# Patient Record
Sex: FEMALE | Race: BLACK OR AFRICAN AMERICAN | Employment: FULL TIME | URBAN - METROPOLITAN AREA
[De-identification: names, ages, dates, MRNs, and addresses within clinical notes are randomized per-mention and may not be internally consistent; named-entity substitution may affect disease eponyms.]

---

## 2017-11-11 ENCOUNTER — APPOINTMENT (OUTPATIENT)
Dept: CT IMAGING | Age: 27
End: 2017-11-11
Attending: EMERGENCY MEDICINE
Payer: SELF-PAY

## 2017-11-11 ENCOUNTER — HOSPITAL ENCOUNTER (EMERGENCY)
Age: 27
Discharge: HOME | End: 2017-11-11
Attending: EMERGENCY MEDICINE
Payer: SELF-PAY

## 2017-11-11 VITALS
HEART RATE: 80 BPM | BODY MASS INDEX: 30.22 KG/M2 | DIASTOLIC BLOOD PRESSURE: 77 MMHG | SYSTOLIC BLOOD PRESSURE: 109 MMHG | RESPIRATION RATE: 15 BRPM | HEIGHT: 66 IN | TEMPERATURE: 98.3 F | OXYGEN SATURATION: 99 % | WEIGHT: 188 LBS

## 2017-11-11 DIAGNOSIS — K62.5 RECTAL BLEEDING: Primary | ICD-10-CM

## 2017-11-11 DIAGNOSIS — N30.00 ACUTE CYSTITIS WITHOUT HEMATURIA: ICD-10-CM

## 2017-11-11 DIAGNOSIS — K52.9 GASTROENTERITIS, ACUTE: ICD-10-CM

## 2017-11-11 DIAGNOSIS — R11.2 NAUSEA AND VOMITING, INTRACTABILITY OF VOMITING NOT SPECIFIED, UNSPECIFIED VOMITING TYPE: ICD-10-CM

## 2017-11-11 DIAGNOSIS — R19.7 DIARRHEA, UNSPECIFIED TYPE: ICD-10-CM

## 2017-11-11 LAB
ALBUMIN SERPL-MCNC: 3.9 G/DL (ref 3.5–5)
ALBUMIN/GLOB SERPL: 0.9 {RATIO} (ref 1.1–2.2)
ALP SERPL-CCNC: 92 U/L (ref 45–117)
ALT SERPL-CCNC: 57 U/L (ref 12–78)
ANION GAP SERPL CALC-SCNC: 8 MMOL/L (ref 5–15)
APPEARANCE UR: ABNORMAL
AST SERPL-CCNC: 23 U/L (ref 15–37)
BACTERIA URNS QL MICRO: NEGATIVE /HPF
BASOPHILS # BLD: 0 K/UL (ref 0–0.1)
BASOPHILS NFR BLD: 0 % (ref 0–1)
BILIRUB SERPL-MCNC: 0.3 MG/DL (ref 0.2–1)
BILIRUB UR QL: NEGATIVE
BUN SERPL-MCNC: 10 MG/DL (ref 6–20)
BUN/CREAT SERPL: 12 (ref 12–20)
CALCIUM SERPL-MCNC: 8.6 MG/DL (ref 8.5–10.1)
CHLORIDE SERPL-SCNC: 105 MMOL/L (ref 97–108)
CO2 SERPL-SCNC: 26 MMOL/L (ref 21–32)
COLOR UR: ABNORMAL
CREAT SERPL-MCNC: 0.85 MG/DL (ref 0.55–1.02)
EOSINOPHIL # BLD: 0.3 K/UL (ref 0–0.4)
EOSINOPHIL NFR BLD: 4 % (ref 0–7)
EPITH CASTS URNS QL MICRO: ABNORMAL /LPF
ERYTHROCYTE [DISTWIDTH] IN BLOOD BY AUTOMATED COUNT: 12.3 % (ref 11.5–14.5)
GLOBULIN SER CALC-MCNC: 4.2 G/DL (ref 2–4)
GLUCOSE SERPL-MCNC: 80 MG/DL (ref 65–100)
GLUCOSE UR STRIP.AUTO-MCNC: NEGATIVE MG/DL
HCG UR QL: NEGATIVE
HCT VFR BLD AUTO: 38.7 % (ref 35–47)
HEMOCCULT STL QL: POSITIVE
HGB BLD-MCNC: 13.1 G/DL (ref 11.5–16)
HGB UR QL STRIP: ABNORMAL
HYALINE CASTS URNS QL MICRO: ABNORMAL /LPF (ref 0–5)
KETONES UR QL STRIP.AUTO: NEGATIVE MG/DL
LEUKOCYTE ESTERASE UR QL STRIP.AUTO: ABNORMAL
LIPASE SERPL-CCNC: 80 U/L (ref 73–393)
LYMPHOCYTES # BLD: 1.8 K/UL (ref 0.8–3.5)
LYMPHOCYTES NFR BLD: 26 % (ref 12–49)
MCH RBC QN AUTO: 30.2 PG (ref 26–34)
MCHC RBC AUTO-ENTMCNC: 33.9 G/DL (ref 30–36.5)
MCV RBC AUTO: 89.2 FL (ref 80–99)
MONOCYTES # BLD: 0.6 K/UL (ref 0–1)
MONOCYTES NFR BLD: 9 % (ref 5–13)
NEUTS SEG # BLD: 4.1 K/UL (ref 1.8–8)
NEUTS SEG NFR BLD: 61 % (ref 32–75)
NITRITE UR QL STRIP.AUTO: NEGATIVE
PH UR STRIP: 5.5 [PH] (ref 5–8)
PLATELET # BLD AUTO: 313 K/UL (ref 150–400)
POTASSIUM SERPL-SCNC: 3.9 MMOL/L (ref 3.5–5.1)
PROT SERPL-MCNC: 8.1 G/DL (ref 6.4–8.2)
PROT UR STRIP-MCNC: NEGATIVE MG/DL
RBC # BLD AUTO: 4.34 M/UL (ref 3.8–5.2)
RBC #/AREA URNS HPF: ABNORMAL /HPF (ref 0–5)
SODIUM SERPL-SCNC: 139 MMOL/L (ref 136–145)
SP GR UR REFRACTOMETRY: 1.03 (ref 1–1.03)
UR CULT HOLD, URHOLD: NORMAL
UROBILINOGEN UR QL STRIP.AUTO: 0.2 EU/DL (ref 0.2–1)
WBC # BLD AUTO: 6.7 K/UL (ref 3.6–11)
WBC URNS QL MICRO: ABNORMAL /HPF (ref 0–4)

## 2017-11-11 PROCEDURE — 82272 OCCULT BLD FECES 1-3 TESTS: CPT | Performed by: EMERGENCY MEDICINE

## 2017-11-11 PROCEDURE — 74011250637 HC RX REV CODE- 250/637: Performed by: EMERGENCY MEDICINE

## 2017-11-11 PROCEDURE — 81001 URINALYSIS AUTO W/SCOPE: CPT | Performed by: EMERGENCY MEDICINE

## 2017-11-11 PROCEDURE — 85025 COMPLETE CBC W/AUTO DIFF WBC: CPT | Performed by: EMERGENCY MEDICINE

## 2017-11-11 PROCEDURE — 96375 TX/PRO/DX INJ NEW DRUG ADDON: CPT

## 2017-11-11 PROCEDURE — 96361 HYDRATE IV INFUSION ADD-ON: CPT

## 2017-11-11 PROCEDURE — 83690 ASSAY OF LIPASE: CPT | Performed by: EMERGENCY MEDICINE

## 2017-11-11 PROCEDURE — 74011250636 HC RX REV CODE- 250/636: Performed by: EMERGENCY MEDICINE

## 2017-11-11 PROCEDURE — 36415 COLL VENOUS BLD VENIPUNCTURE: CPT | Performed by: EMERGENCY MEDICINE

## 2017-11-11 PROCEDURE — 74177 CT ABD & PELVIS W/CONTRAST: CPT

## 2017-11-11 PROCEDURE — 99284 EMERGENCY DEPT VISIT MOD MDM: CPT

## 2017-11-11 PROCEDURE — 74011000258 HC RX REV CODE- 258: Performed by: EMERGENCY MEDICINE

## 2017-11-11 PROCEDURE — 80053 COMPREHEN METABOLIC PANEL: CPT | Performed by: EMERGENCY MEDICINE

## 2017-11-11 PROCEDURE — 96365 THER/PROPH/DIAG IV INF INIT: CPT

## 2017-11-11 PROCEDURE — 74011636320 HC RX REV CODE- 636/320: Performed by: EMERGENCY MEDICINE

## 2017-11-11 PROCEDURE — 81025 URINE PREGNANCY TEST: CPT

## 2017-11-11 RX ORDER — ONDANSETRON 4 MG/1
4 TABLET, ORALLY DISINTEGRATING ORAL
Qty: 10 TAB | Refills: 0 | Status: SHIPPED | OUTPATIENT
Start: 2017-11-11 | End: 2019-07-02

## 2017-11-11 RX ORDER — SODIUM CHLORIDE 0.9 % (FLUSH) 0.9 %
SYRINGE (ML) INJECTION
Status: COMPLETED
Start: 2017-11-11 | End: 2017-11-11

## 2017-11-11 RX ORDER — DICYCLOMINE HYDROCHLORIDE 10 MG/1
10 CAPSULE ORAL
Qty: 20 CAP | Refills: 0 | Status: SHIPPED | OUTPATIENT
Start: 2017-11-11 | End: 2017-11-16

## 2017-11-11 RX ORDER — DICYCLOMINE HYDROCHLORIDE 10 MG/ML
20 INJECTION INTRAMUSCULAR
Status: DISCONTINUED | OUTPATIENT
Start: 2017-11-11 | End: 2017-11-11

## 2017-11-11 RX ORDER — ONDANSETRON 2 MG/ML
4 INJECTION INTRAMUSCULAR; INTRAVENOUS
Status: COMPLETED | OUTPATIENT
Start: 2017-11-11 | End: 2017-11-11

## 2017-11-11 RX ORDER — CEPHALEXIN 500 MG/1
500 CAPSULE ORAL 3 TIMES DAILY
Qty: 21 CAP | Refills: 0 | Status: SHIPPED | OUTPATIENT
Start: 2017-11-11 | End: 2017-11-18

## 2017-11-11 RX ORDER — SODIUM CHLORIDE 0.9 % (FLUSH) 0.9 %
5-10 SYRINGE (ML) INJECTION AS NEEDED
Status: DISCONTINUED | OUTPATIENT
Start: 2017-11-11 | End: 2017-11-11 | Stop reason: HOSPADM

## 2017-11-11 RX ORDER — SODIUM CHLORIDE 0.9 % (FLUSH) 0.9 %
5-10 SYRINGE (ML) INJECTION EVERY 8 HOURS
Status: DISCONTINUED | OUTPATIENT
Start: 2017-11-11 | End: 2017-11-11 | Stop reason: HOSPADM

## 2017-11-11 RX ORDER — SODIUM CHLORIDE 0.9 % (FLUSH) 0.9 %
10 SYRINGE (ML) INJECTION
Status: DISCONTINUED | OUTPATIENT
Start: 2017-11-11 | End: 2017-11-11

## 2017-11-11 RX ORDER — DICYCLOMINE HYDROCHLORIDE 10 MG/1
10 CAPSULE ORAL
Status: COMPLETED | OUTPATIENT
Start: 2017-11-11 | End: 2017-11-11

## 2017-11-11 RX ADMIN — DICYCLOMINE HYDROCHLORIDE 10 MG: 10 CAPSULE ORAL at 16:24

## 2017-11-11 RX ADMIN — SODIUM CHLORIDE 100 ML: 900 INJECTION, SOLUTION INTRAVENOUS at 17:10

## 2017-11-11 RX ADMIN — IOPAMIDOL 100 ML: 755 INJECTION, SOLUTION INTRAVENOUS at 17:10

## 2017-11-11 RX ADMIN — ONDANSETRON 4 MG: 2 INJECTION INTRAMUSCULAR; INTRAVENOUS at 16:24

## 2017-11-11 RX ADMIN — SODIUM CHLORIDE 1000 ML: 900 INJECTION, SOLUTION INTRAVENOUS at 16:24

## 2017-11-11 RX ADMIN — CEFTRIAXONE 1 G: 1 INJECTION, POWDER, FOR SOLUTION INTRAMUSCULAR; INTRAVENOUS at 18:07

## 2017-11-11 RX ADMIN — Medication 10 ML: at 17:22

## 2017-11-11 NOTE — DISCHARGE INSTRUCTIONS
We hope that we have addressed all of your medical concerns. The examination and treatment you received in the Emergency Department were for an emergent problem and were not intended as complete care. It is important that you follow up with your healthcare provider(s) for ongoing care. If your symptoms worsen or do not improve as expected, and you are unable to reach your usual health care provider(s), you should return to the Emergency Department. Today's healthcare is undergoing tremendous change, and patient satisfaction surveys are one of the many tools to assess the quality of medical care. You may receive a survey from the [employer] organization regarding your experience in the Emergency Department. I hope that your experience has been completely positive, particularly the medical care that I provided. As such, please participate in the survey; anything less than excellent does not meet my expectations or intentions. [facility] and [facility] participate in nationally recognized quality of care measures. If your blood pressure is greater than 120/80, as reported below, we urge that you seek medical care to address the potential of high blood pressure, commonly known as hypertension. Hypertension can be hereditary or can be caused by certain medical conditions, pain, stress, or \"white coat syndrome. \"       Please make an appointment with your health care provider(s) for follow up of your Emergency Department visit. VITALS:   Patient Vitals for the past 8 hrs:   Temp Pulse Resp BP SpO2   11/11/17 1748 - - - 108/75 100 %   11/11/17 1700 - - - 109/76 100 %   11/11/17 1601 - 91 - 110/73 97 %   11/11/17 1537 97.7 °F (36.5 °C) 93 16 131/88 99 %          Thank you for allowing us to provide you with medical care today. We realize that you have many choices for your emergency care needs.   Please choose us in the future for any continued health care needs. Frieda Hoang Bartolo Michael, [address]   Office: [phone]            Recent Results (from the past 24 hour(s))   CBC WITH AUTOMATED DIFF    Collection Time: 11/11/17  4:02 PM   Result Value Ref Range    WBC 6.7 3.6 - 11.0 K/uL    RBC 4.34 3.80 - 5.20 M/uL    HGB 13.1 11.5 - 16.0 g/dL    HCT 38.7 35.0 - 47.0 %    MCV 89.2 80.0 - 99.0 FL    MCH 30.2 26.0 - 34.0 PG    MCHC 33.9 30.0 - 36.5 g/dL    RDW 12.3 11.5 - 14.5 %    PLATELET 200 331 - 257 K/uL    NEUTROPHILS 61 32 - 75 %    LYMPHOCYTES 26 12 - 49 %    MONOCYTES 9 5 - 13 %    EOSINOPHILS 4 0 - 7 %    BASOPHILS 0 0 - 1 %    ABS. NEUTROPHILS 4.1 1.8 - 8.0 K/UL    ABS. LYMPHOCYTES 1.8 0.8 - 3.5 K/UL    ABS. MONOCYTES 0.6 0.0 - 1.0 K/UL    ABS. EOSINOPHILS 0.3 0.0 - 0.4 K/UL    ABS. BASOPHILS 0.0 0.0 - 0.1 K/UL   METABOLIC PANEL, COMPREHENSIVE    Collection Time: 11/11/17  4:02 PM   Result Value Ref Range    Sodium 139 136 - 145 mmol/L    Potassium 3.9 3.5 - 5.1 mmol/L    Chloride 105 97 - 108 mmol/L    CO2 26 21 - 32 mmol/L    Anion gap 8 5 - 15 mmol/L    Glucose 80 65 - 100 mg/dL    BUN 10 6 - 20 MG/DL    Creatinine 0.85 0.55 - 1.02 MG/DL    BUN/Creatinine ratio 12 12 - 20      GFR est AA >60 >60 ml/min/1.73m2    GFR est non-AA >60 >60 ml/min/1.73m2    Calcium 8.6 8.5 - 10.1 MG/DL    Bilirubin, total 0.3 0.2 - 1.0 MG/DL    ALT (SGPT) 57 12 - 78 U/L    AST (SGOT) 23 15 - 37 U/L    Alk.  phosphatase 92 45 - 117 U/L    Protein, total 8.1 6.4 - 8.2 g/dL    Albumin 3.9 3.5 - 5.0 g/dL    Globulin 4.2 (H) 2.0 - 4.0 g/dL    A-G Ratio 0.9 (L) 1.1 - 2.2     LIPASE    Collection Time: 11/11/17  4:02 PM   Result Value Ref Range    Lipase 80 73 - 393 U/L   URINALYSIS W/MICROSCOPIC    Collection Time: 11/11/17  4:02 PM   Result Value Ref Range    Color YELLOW/STRAW      Appearance CLOUDY (A) CLEAR      Specific gravity 1.029 1.003 - 1.030      pH (UA) 5.5 5.0 - 8.0      Protein NEGATIVE  NEG mg/dL    Glucose NEGATIVE  NEG mg/dL    Ketone NEGATIVE  NEG mg/dL    Bilirubin NEGATIVE  NEG      Blood SMALL (A) NEG      Urobilinogen 0.2 0.2 - 1.0 EU/dL    Nitrites NEGATIVE  NEG      Leukocyte Esterase MODERATE (A) NEG      WBC  0 - 4 /hpf    RBC 5-10 0 - 5 /hpf    Epithelial cells MODERATE (A) FEW /lpf    Bacteria NEGATIVE  NEG /hpf    Hyaline cast 2-5 0 - 5 /lpf   URINE CULTURE HOLD SAMPLE    Collection Time: 11/11/17  4:02 PM   Result Value Ref Range    Urine culture hold URINE ON HOLD IN MICROBIOLOGY DEPT FOR 3 DAYS     OCCULT BLOOD, STOOL    Collection Time: 11/11/17  4:02 PM   Result Value Ref Range    Occult blood, stool POSITIVE (A) NEG     HCG URINE, QL. - POC    Collection Time: 11/11/17  4:10 PM   Result Value Ref Range    Pregnancy test,urine (POC) NEGATIVE  NEG         Ct Abd Pelv W Cont    Result Date: 11/11/2017  INDICATION: abd pain, ? colitis EXAM: CT Abdomen and CT Pelvis are performed with 100 mL Isovue 370 contrast IV without complication. CT dose reduction was achieved through use of a standardized protocol tailored for this examination and automatic exposure control for dose modulation. FINDINGS: There is no focal inflammation, ascites, free air or significant adenopathy. Liver contains 2 hemangiomas. Bile ducts are not enlarged. Pancreas shows no mass or inflammation. Spleen is unremarkable. Adrenal glands are normal in size. Kidneys show no mass or hydronephrosis. Aorta shows no aneurysm. The appendix is normal. The bladder is midline and the distal ureters are not dilated. There is no apparent pelvic mass. There is a small fibroid. IMPRESSION: No Acute Disease. Lower Gastrointestinal Bleeding: Care Instructions  Your Care Instructions    The digestive or gastrointestinal tract goes from the mouth to the anus. It is often called the GI tract. Bleeding in the lower GI tract can happen anywhere in your small or large intestine. It can also happen in your rectum or anus.  In some cases, it is caused by an infection, cancer, or inflammatory bowel disease. Or it may be caused by hemorrhoids, diverticulitis, or clotting problems. Light bleeding may not cause any symptoms at first. But if you continue to bleed for a while, you may feel very weak or tired. Sudden, heavy bleeding means you need to see a doctor right away. This kind of bleeding can be very dangerous. But it can usually be cured or controlled. The doctor may do some tests to find the cause of your bleeding. Follow-up care is a key part of your treatment and safety. Be sure to make and go to all appointments, and call your doctor if you are having problems. It's also a good idea to know your test results and keep a list of the medicines you take. How can you care for yourself at home? · Be safe with medicines. Take your medicines exactly as prescribed. Call your doctor if you think you are having a problem with your medicine. You will get more details on the specific medicines your doctor prescribes. · Do not take aspirin or other anti-inflammatory medicines, such as naproxen (Aleve) or ibuprofen (Advil, Motrin), without talking to your doctor first. Ask your doctor if it is okay to use acetaminophen (Tylenol). · Do not drink alcohol. · The bleeding may make you lose iron. So it's important to eat foods that have a lot of iron. These include red meat, shellfish, poultry, and eggs. They also include beans, raisins, whole-grain breads, and leafy green vegetables. If you want help planning meals, you can meet with a dietitian. When should you call for help? Call 911 anytime you think you may need emergency care. For example, call if:  ? · You have sudden, severe belly pain. ? · You vomit blood or what looks like coffee grounds. ? · You passed out (lost consciousness). ? · Your stools are maroon or very bloody.    ?Call your doctor now or seek immediate medical care if:  ? · You are dizzy or lightheaded, or you feel like you may faint. ? · Your stools are black and look like tar, or they have streaks of blood. ? · You have belly pain. ? · You vomit or have nausea. ? Watch closely for changes in your health, and be sure to contact your doctor if you do not get better as expected. Where can you learn more? Go to http://anjali-nhna.info/. Enter A772 in the search box to learn more about \"Lower Gastrointestinal Bleeding: Care Instructions. \"  Current as of: March 20, 2017  Content Version: 11.4  © 2660-7669 [employer]. Care instructions adapted under license by [employer] (which disclaims liability or warranty for this information). If you have questions about a medical condition or this instruction, always ask your healthcare professional. [address] any warranty or liability for your use of this information. Gastroenteritis: Care Instructions  Your Care Instructions    Gastroenteritis is an illness that may cause nausea, vomiting, and diarrhea. It is sometimes called \"stomach flu. \" It can be caused by bacteria or a virus. You will probably begin to feel better in 1 to 2 days. In the meantime, get plenty of rest and make sure you do not become dehydrated. Dehydration occurs when your body loses too much fluid. Follow-up care is a key part of your treatment and safety. Be sure to make and go to all appointments, and call your doctor if you are having problems. It's also a good idea to know your test results and keep a list of the medicines you take. How can you care for yourself at home? · If your doctor prescribed antibiotics, take them as directed. Do not stop taking them just because you feel better. You need to take the full course of antibiotics. · Drink plenty of fluids to prevent dehydration, enough so that your urine is light yellow or clear like water. Choose water and other caffeine-free clear liquids until you feel better.  If you have kidney, heart, or liver disease and have to limit fluids, talk with your doctor before you increase your fluid intake. · Drink fluids slowly, in frequent, small amounts, because drinking too much too fast can cause vomiting. · Begin eating mild foods, such as dry toast, yogurt, applesauce, bananas, and rice. Avoid spicy, hot, or high-fat foods, and do not drink alcohol or caffeine for a day or two. Do not drink milk or eat ice cream until you are feeling better. How to prevent gastroenteritis  · Keep hot foods hot and cold foods cold. · Do not eat meats, dressings, salads, or other foods that have been kept at room temperature for more than 2 hours. · Use a thermometer to check your refrigerator. It should be between 34°F and 40°F.  · Defrost meats in the refrigerator or microwave, not on the kitchen counter. · Keep your hands and your kitchen clean. Wash your hands, cutting boards, and countertops with hot soapy water frequently. · Cook meat until it is well done. · Do not eat raw eggs or uncooked sauces made with raw eggs. · Do not take chances. If food looks or tastes spoiled, throw it out. When should you call for help? Call 911 anytime you think you may need emergency care. For example, call if:  ? · You vomit blood or what looks like coffee grounds. ? · You passed out (lost consciousness). ? · You pass maroon or very bloody stools. ?Call your doctor now or seek immediate medical care if:  ? · You have severe belly pain. ? · You have signs of needing more fluids. You have sunken eyes, a dry mouth, and pass only a little dark urine. ? · You feel like you are going to faint. ? · You have increased belly pain that does not go away in 1 to 2 days. ? · You have new or increased nausea, or you are vomiting. ? · You have a new or higher fever. ? · Your stools are black and tarlike or have streaks of blood. ? Watch closely for changes in your health, and be sure to contact your doctor if:  ? · You are dizzy or lightheaded. ? · You urinate less than usual, or your urine is dark yellow or brown. ? · You do not feel better with each day that goes by. Where can you learn more? Go to http://anjali-nhan.info/. Enter N142 in the search box to learn more about \"Gastroenteritis: Care Instructions. \"  Current as of: March 3, 2017  Content Version: 11.4  © 7314-8845 [employer]. Care instructions adapted under license by [employer] (which disclaims liability or warranty for this information). If you have questions about a medical condition or this instruction, always ask your healthcare professional. [place] [address] any warranty or liability for your use of this information. Nausea and Vomiting: Care Instructions  Your Care Instructions    When you are nauseated, you may feel weak and sweaty and notice a lot of saliva in your mouth. Nausea often leads to vomiting. Most of the time you do not need to worry about nausea and vomiting, but they can be signs of other illnesses. Two common causes of nausea and vomiting are stomach flu and food poisoning. Nausea and vomiting from viral stomach flu will usually start to improve within 24 hours. Nausea and vomiting from food poisoning may last from 12 to 48 hours. The doctor has checked you carefully, but problems can develop later. If you notice any problems or new symptoms, get medical treatment right away. Follow-up care is a key part of your treatment and safety. Be sure to make and go to all appointments, and call your doctor if you are having problems. It's also a good idea to know your test results and keep a list of the medicines you take. How can you care for yourself at home? · To prevent dehydration, drink plenty of fluids, enough so that your urine is light yellow or clear like water. Choose water and other caffeine-free clear liquids until you feel better.  If you have kidney, heart, or liver disease and have to limit fluids, talk with your doctor before you increase the amount of fluids you drink. · Rest in bed until you feel better. · When you are able to eat, try clear soups, mild foods, and liquids until all symptoms are gone for 12 to 48 hours. Other good choices include dry toast, crackers, cooked cereal, and gelatin dessert, such as Jell-O. When should you call for help? Call 911 anytime you think you may need emergency care. For example, call if:  ? · You passed out (lost consciousness). ?Call your doctor now or seek immediate medical care if:  ? · You have symptoms of dehydration, such as:  ¨ Dry eyes and a dry mouth. ¨ Passing only a little dark urine. ¨ Feeling thirstier than usual.   ? · You have new or worsening belly pain. ? · You have a new or higher fever. ? · You vomit blood or what looks like coffee grounds. ? Watch closely for changes in your health, and be sure to contact your doctor if:  ? · You have ongoing nausea and vomiting. ? · Your vomiting is getting worse. ? · Your vomiting lasts longer than 2 days. ? · You are not getting better as expected. Where can you learn more? Go to http://anjali-nhan.info/. Enter 25 [id number] in the search box to learn more about \"Nausea and Vomiting: Care Instructions. \"  Current as of: March 20, 2017  Content Version: 11.4  © 8284-5228 [employer]. Care instructions adapted under license by [employer] (which disclaims liability or warranty for this information). If you have questions about a medical condition or this instruction, always ask your healthcare professional. [place] [address] any warranty or liability for your use of this information. Diarrhea: Care Instructions  Your Care Instructions    Diarrhea is loose, watery stools (bowel movements). The exact cause is often hard to find.  Sometimes diarrhea is your body's way of getting rid of what caused an upset stomach. Viruses, food poisoning, and many medicines can cause diarrhea. Some people get diarrhea in response to emotional stress, anxiety, or certain foods. Almost everyone has diarrhea now and then. It usually isn't serious, and your stools will return to normal soon. The important thing to do is replace the fluids you have lost, so you can prevent dehydration. The doctor has checked you carefully, but problems can develop later. If you notice any problems or new symptoms, get medical treatment right away. Follow-up care is a key part of your treatment and safety. Be sure to make and go to all appointments, and call your doctor if you are having problems. It's also a good idea to know your test results and keep a list of the medicines you take. How can you care for yourself at home? · Watch for signs of dehydration, which means your body has lost too much water. Dehydration is a serious condition and should be treated right away. Signs of dehydration are:  ¨ Increasing thirst and dry eyes and mouth. ¨ Feeling faint or lightheaded. ¨ Darker urine, and a smaller amount of urine than normal.  · To prevent dehydration, drink plenty of fluids, enough so that your urine is light yellow or clear like water. Choose water and other caffeine-free clear liquids until you feel better. If you have kidney, heart, or liver disease and have to limit fluids, talk with your doctor before you increase the amount of fluids you drink. · Begin eating small amounts of mild foods the next day, if you feel like it. ¨ Try yogurt that has live cultures of Lactobacillus. (Check the label.)  ¨ Avoid spicy foods, fruits, alcohol, and caffeine until 48 hours after all symptoms are gone. ¨ Avoid chewing gum that contains sorbitol. ¨ Avoid dairy products (except for yogurt with Lactobacillus) while you have diarrhea and for 3 days after symptoms are gone.   · The doctor may recommend that you take over-the-counter medicine, such as loperamide (Imodium), if you still have diarrhea after 6 hours. Read and follow all instructions on the label. Do not use this medicine if you have bloody diarrhea, a high fever, or other signs of serious illness. Call your doctor if you think you are having a problem with your medicine. When should you call for help? Call 911 anytime you think you may need emergency care. For example, call if:  ? · You passed out (lost consciousness). ? · Your stools are maroon or very bloody. ?Call your doctor now or seek immediate medical care if:  ? · You are dizzy or lightheaded, or you feel like you may faint. ? · Your stools are black and look like tar, or they have streaks of blood. ? · You have new or worse belly pain. ? · You have symptoms of dehydration, such as:  ¨ Dry eyes and a dry mouth. ¨ Passing only a little dark urine. ¨ Feeling thirstier than usual.   ? · You have a new or higher fever. ? Watch closely for changes in your health, and be sure to contact your doctor if:  ? · Your diarrhea is getting worse. ? · You see pus in the diarrhea. ? · You are not getting better after 2 days (48 hours). Where can you learn more? Go to http://anjali-nhan.info/. Enter M110 in the search box to learn more about \"Diarrhea: Care Instructions. \"  Current as of: March 20, 2017  Content Version: 11.4  © 7781-5730 [employer]. Care instructions adapted under license by [employer] (which disclaims liability or warranty for this information). If you have questions about a medical condition or this instruction, always ask your healthcare professional. [place] [address] any warranty or liability for your use of this information.          Urinary Tract Infection in Women: Care Instructions  Your Care Instructions    A urinary tract infection, or UTI, is a general term for an infection anywhere between the kidneys and the urethra (where urine comes out). Most UTIs are bladder infections. They often cause pain or burning when you urinate. UTIs are caused by bacteria and can be cured with antibiotics. Be sure to complete your treatment so that the infection goes away. Follow-up care is a key part of your treatment and safety. Be sure to make and go to all appointments, and call your doctor if you are having problems. It's also a good idea to know your test results and keep a list of the medicines you take. How can you care for yourself at home? · Take your antibiotics as directed. Do not stop taking them just because you feel better. You need to take the full course of antibiotics. · Drink extra water and other fluids for the next day or two. This may help wash out the bacteria that are causing the infection. (If you have kidney, heart, or liver disease and have to limit fluids, talk with your doctor before you increase your fluid intake.)  · Avoid drinks that are carbonated or have caffeine. They can irritate the bladder. · Urinate often. Try to empty your bladder each time. · To relieve pain, take a hot bath or lay a heating pad set on low over your lower belly or genital area. Never go to sleep with a heating pad in place. To prevent UTIs  · Drink plenty of water each day. This helps you urinate often, which clears bacteria from your system. (If you have kidney, heart, or liver disease and have to limit fluids, talk with your doctor before you increase your fluid intake.)  · Urinate when you need to. · Urinate right after you have sex. · Change sanitary pads often. · Avoid douches, bubble baths, feminine hygiene sprays, and other feminine hygiene products that have deodorants. · After going to the bathroom, wipe from front to back. When should you call for help? Call your doctor now or seek immediate medical care if:  ? · Symptoms such as fever, chills, nausea, or vomiting get worse or appear for the first time. ? · You have new pain in your back just below your rib cage. This is called flank pain. ? · There is new blood or pus in your urine. ? · You have any problems with your antibiotic medicine. ? Watch closely for changes in your health, and be sure to contact your doctor if:  ? · You are not getting better after taking an antibiotic for 2 days. ? · Your symptoms go away but then come back. Where can you learn more? Go to http://anjali-nhan.info/. Enter S786 in the search box to learn more about \"Urinary Tract Infection in Women: Care Instructions. \"  Current as of: May 12, 2017  Content Version: 11.4  © 3021-4000 [employer]. Care instructions adapted under license by [employer] (which disclaims liability or warranty for this information). If you have questions about a medical condition or this instruction, always ask your healthcare professional. [address] any warranty or liability for your use of this information.

## 2017-11-11 NOTE — ED PROVIDER NOTES
HPI Comments: 28 y.o. female with no significant past medical history who presents from home with chief complaint of rectal bleeding. Per pt, she went to urinate this evening around 1530 and upon wiping, noted that there was blood present. The pt reports that she has not experienced any dysuria or hematuria, yet has experienced associated rectal discomfort. She rates her current pain 5/10. The pt states that the blood appeared bright red in color. Per pt, she experienced an episode of diarrhea last night along with N/V and abd cramping which she associated with the food she ate. At that time, the pt makes it known that no blood was present in her stool. The pt notes that this cramping discomfort has continued today (11/11/2017). When asked if the pt has past hx of abdominal operations, she states she does not. The pt notes that her last menstrual period occurred on November 5 th and appeared normal. She denies fever, chills, vaginal discharge and vaginal bleeding. There are no other acute medical concerns at this time. Social hx: Non-smoker, Current social ETOH use    PCP: None    Note written by Barb Bacon, as dictated by Silva Weston DO 3:55 PM          The history is provided by the patient. No  was used. History reviewed. No pertinent past medical history. History reviewed. No pertinent surgical history. History reviewed. No pertinent family history. Social History     Social History    Marital status:      Spouse name: N/A    Number of children: N/A    Years of education: N/A     Occupational History    Not on file.      Social History Main Topics    Smoking status: Never Smoker    Smokeless tobacco: Never Used    Alcohol use Yes    Drug use: No    Sexual activity: Not on file     Other Topics Concern    Not on file     Social History Narrative    No narrative on file     ALLERGIES: Latex    Review of Systems   Constitutional: Negative. Negative for appetite change, chills, fever and unexpected weight change. HENT: Negative. Negative for ear pain, hearing loss, rhinorrhea and trouble swallowing. Eyes: Negative. Negative for pain and visual disturbance. Respiratory: Negative. Negative for cough, chest tightness and shortness of breath. Cardiovascular: Negative. Negative for chest pain and palpitations. Gastrointestinal: Positive for abdominal pain (cramping like discomfort since last night (11/10/2017)), anal bleeding (noticed upon wiping this evening while urinating ), diarrhea (present yesterday along with N/V. No blood evident at that time. ), nausea, rectal pain (onset this morning following onset of rectal bleeding. ) and vomiting. Negative for abdominal distention and blood in stool. Endocrine: Negative. Genitourinary: Negative. Negative for dysuria, hematuria and urgency. Musculoskeletal: Negative. Negative for back pain and myalgias. Skin: Negative. Negative for rash. Allergic/Immunologic: Negative. Neurological: Negative. Negative for dizziness, syncope, weakness and numbness. Hematological: Negative. Psychiatric/Behavioral: Negative. Negative for confusion and suicidal ideas. All other systems reviewed and are negative. Vitals:    11/11/17 1537   BP: 131/88   Pulse: 93   Resp: 16   Temp: 97.7 °F (36.5 °C)   SpO2: 99%   Weight: 85.3 kg (188 lb)   Height: 5' 6\" (1.676 m)            Physical Exam   Constitutional: She is oriented to person, place, and time. She appears well-developed and well-nourished. No distress. HENT:   Head: Normocephalic and atraumatic. Right Ear: External ear normal.   Left Ear: External ear normal.   Nose: Nose normal.   Mouth/Throat: Oropharynx is clear and moist. No oropharyngeal exudate. Eyes: Conjunctivae and EOM are normal. Pupils are equal, round, and reactive to light. Right eye exhibits no discharge. Left eye exhibits no discharge.  No scleral icterus. Neck: Normal range of motion. Neck supple. No JVD present. No tracheal deviation present. Cardiovascular: Normal rate, regular rhythm, normal heart sounds and intact distal pulses. Exam reveals no gallop and no friction rub. No murmur heard. Pulmonary/Chest: Effort normal and breath sounds normal. No stridor. No respiratory distress. She has no decreased breath sounds. She has no wheezes. She has no rhonchi. She has no rales. She exhibits no tenderness. Abdominal: Soft. Bowel sounds are normal. She exhibits no distension. There is tenderness (Diffuse). There is no rebound and no guarding. Genitourinary: Rectal exam shows guaiac positive stool. Rectal exam shows no external hemorrhoid, no internal hemorrhoid, no fissure, no mass, no tenderness and anal tone normal.   Genitourinary Comments: No hemorrhoids noted, however the pt is hemoccult positive. Musculoskeletal: Normal range of motion. She exhibits no edema or tenderness. Neurological: She is alert and oriented to person, place, and time. She has normal strength and normal reflexes. No cranial nerve deficit or sensory deficit. She exhibits normal muscle tone. Coordination normal. GCS eye subscore is 4. GCS verbal subscore is 5. GCS motor subscore is 6. Skin: Skin is warm and dry. No rash noted. She is not diaphoretic. No erythema. No pallor. Psychiatric: She has a normal mood and affect. Her behavior is normal. Judgment and thought content normal.   Nursing note and vitals reviewed.      Note written by Barb Batista, as dictated by Leighton Larson DO 3:55 PM    MDM  Number of Diagnoses or Management Options  Acute cystitis without hematuria:   Diarrhea, unspecified type:   Gastroenteritis, acute:   Nausea and vomiting, intractability of vomiting not specified, unspecified vomiting type:   Rectal bleeding:      Amount and/or Complexity of Data Reviewed  Clinical lab tests: ordered and reviewed  Tests in the radiology section of CPT®: ordered and reviewed    Risk of Complications, Morbidity, and/or Mortality  Presenting problems: moderate  Diagnostic procedures: moderate  Management options: moderate    Patient Progress  Patient progress: stable    ED Course       Procedures  Chief Complaint   Patient presents with    Rectal Bleeding       9:28 PM  The patients presenting problems have been discussed, and they are in agreement with the care plan formulated and outlined with them. I have encouraged them to ask questions as they arise throughout their visit. MEDICATIONS GIVEN:  Medications   sodium chloride (NS) flush 5-10 mL (not administered)   sodium chloride (NS) flush 5-10 mL (not administered)   ondansetron (ZOFRAN) injection 4 mg (4 mg IntraVENous Given 11/11/17 1624)   sodium chloride 0.9 % bolus infusion 1,000 mL (0 mL IntraVENous IV Completed 11/11/17 1724)   sodium chloride 0.9 % bolus infusion 100 mL (0 mL IntraVENous IV Completed 11/11/17 1753)   iopamidol (ISOVUE-370) 76 % injection 100 mL (100 mL IntraVENous Given 11/11/17 1710)   dicyclomine (BENTYL) capsule 10 mg (10 mg Oral Given 11/11/17 1624)   sodium chloride (NS) 0.9 % flush (10 mL  Given 11/11/17 1722)   cefTRIAXone (ROCEPHIN) 1 g in 0.9% sodium chloride (MBP/ADV) 50 mL (0 g IntraVENous IV Completed 11/11/17 1837)       LABS REVIEWED:  Recent Results (from the past 24 hour(s))   CBC WITH AUTOMATED DIFF    Collection Time: 11/11/17  4:02 PM   Result Value Ref Range    WBC 6.7 3.6 - 11.0 K/uL    RBC 4.34 3.80 - 5.20 M/uL    HGB 13.1 11.5 - 16.0 g/dL    HCT 38.7 35.0 - 47.0 %    MCV 89.2 80.0 - 99.0 FL    MCH 30.2 26.0 - 34.0 PG    MCHC 33.9 30.0 - 36.5 g/dL    RDW 12.3 11.5 - 14.5 %    PLATELET 242 805 - 775 K/uL    NEUTROPHILS 61 32 - 75 %    LYMPHOCYTES 26 12 - 49 %    MONOCYTES 9 5 - 13 %    EOSINOPHILS 4 0 - 7 %    BASOPHILS 0 0 - 1 %    ABS. NEUTROPHILS 4.1 1.8 - 8.0 K/UL    ABS. LYMPHOCYTES 1.8 0.8 - 3.5 K/UL    ABS.  MONOCYTES 0.6 0.0 - 1.0 K/UL ABS. EOSINOPHILS 0.3 0.0 - 0.4 K/UL    ABS. BASOPHILS 0.0 0.0 - 0.1 K/UL   METABOLIC PANEL, COMPREHENSIVE    Collection Time: 11/11/17  4:02 PM   Result Value Ref Range    Sodium 139 136 - 145 mmol/L    Potassium 3.9 3.5 - 5.1 mmol/L    Chloride 105 97 - 108 mmol/L    CO2 26 21 - 32 mmol/L    Anion gap 8 5 - 15 mmol/L    Glucose 80 65 - 100 mg/dL    BUN 10 6 - 20 MG/DL    Creatinine 0.85 0.55 - 1.02 MG/DL    BUN/Creatinine ratio 12 12 - 20      GFR est AA >60 >60 ml/min/1.73m2    GFR est non-AA >60 >60 ml/min/1.73m2    Calcium 8.6 8.5 - 10.1 MG/DL    Bilirubin, total 0.3 0.2 - 1.0 MG/DL    ALT (SGPT) 57 12 - 78 U/L    AST (SGOT) 23 15 - 37 U/L    Alk.  phosphatase 92 45 - 117 U/L    Protein, total 8.1 6.4 - 8.2 g/dL    Albumin 3.9 3.5 - 5.0 g/dL    Globulin 4.2 (H) 2.0 - 4.0 g/dL    A-G Ratio 0.9 (L) 1.1 - 2.2     LIPASE    Collection Time: 11/11/17  4:02 PM   Result Value Ref Range    Lipase 80 73 - 393 U/L   URINALYSIS W/MICROSCOPIC    Collection Time: 11/11/17  4:02 PM   Result Value Ref Range    Color YELLOW/STRAW      Appearance CLOUDY (A) CLEAR      Specific gravity 1.029 1.003 - 1.030      pH (UA) 5.5 5.0 - 8.0      Protein NEGATIVE  NEG mg/dL    Glucose NEGATIVE  NEG mg/dL    Ketone NEGATIVE  NEG mg/dL    Bilirubin NEGATIVE  NEG      Blood SMALL (A) NEG      Urobilinogen 0.2 0.2 - 1.0 EU/dL    Nitrites NEGATIVE  NEG      Leukocyte Esterase MODERATE (A) NEG      WBC  0 - 4 /hpf    RBC 5-10 0 - 5 /hpf    Epithelial cells MODERATE (A) FEW /lpf    Bacteria NEGATIVE  NEG /hpf    Hyaline cast 2-5 0 - 5 /lpf   URINE CULTURE HOLD SAMPLE    Collection Time: 11/11/17  4:02 PM   Result Value Ref Range    Urine culture hold URINE ON HOLD IN MICROBIOLOGY DEPT FOR 3 DAYS     OCCULT BLOOD, STOOL    Collection Time: 11/11/17  4:02 PM   Result Value Ref Range    Occult blood, stool POSITIVE (A) NEG     HCG URINE, QL. - POC    Collection Time: 11/11/17  4:10 PM   Result Value Ref Range    Pregnancy test,urine (POC) NEGATIVE  NEG         VITAL SIGNS:  Patient Vitals for the past 12 hrs:   Temp Pulse Resp BP SpO2   11/11/17 1842 98.3 °F (36.8 °C) 80 15 109/77 99 %   11/11/17 1800 98 °F (36.7 °C) 84 16 113/80 99 %   11/11/17 1748 - - - 108/75 100 %   11/11/17 1700 - - - 109/76 100 %   11/11/17 1601 - 91 - 110/73 97 %   11/11/17 1537 97.7 °F (36.5 °C) 93 16 131/88 99 %       RADIOLOGY RESULTS:  The following have been ordered and reviewed:  CT ABD PELV W CONT   Final Result        Study Result      INDICATION: abd pain, ? colitis      EXAM: CT Abdomen and CT Pelvis are performed with 100 mL Isovue 370 contrast IV  without complication. CT dose reduction was achieved through use of a  standardized protocol tailored for this examination and automatic exposure  control for dose modulation.     FINDINGS:   There is no focal inflammation, ascites, free air or significant adenopathy. Liver contains 2 hemangiomas. Bile ducts are not enlarged. Pancreas shows no  mass or inflammation. Spleen is unremarkable. Adrenal glands are normal in size. Kidneys show no mass or hydronephrosis. Aorta shows no aneurysm.      The appendix is normal. The bladder is midline and the distal ureters are not  dilated. There is no apparent pelvic mass. There is a small fibroid.     IMPRESSION  IMPRESSION: No Acute Disease. PROGRESS NOTES:  Discussed results and plan with patient. Patient will be discharged home with PCP and GI followup. Patient instructed to return to the emergency room for any worsening symptoms or any other concerns. DIAGNOSIS:    1. Rectal bleeding    2. Gastroenteritis, acute    3. Nausea and vomiting, intractability of vomiting not specified, unspecified vomiting type    4. Diarrhea, unspecified type    5.  Acute cystitis without hematuria        PLAN:  Follow-up Information     Follow up With Details Comments Contact Info    None Schedule an appointment as soon as possible for a visit  None ([phone]) Patient stated that they have no PCP      Rexann Lennox, MD In 1 week As needed [address] [facility] [address] DEP  If symptoms worsen [address] [facility]  [phone]        Discharge Medication List as of 11/11/2017  6:15 PM      START taking these medications    Details   ondansetron (ZOFRAN ODT) 4 mg disintegrating tablet Take 1 Tab by mouth every eight (8) hours as needed for Nausea. , Print, Disp-10 Tab, R-0      dicyclomine (BENTYL) 10 mg capsule Take 1 Cap by mouth four (4) times daily as needed for Diarrhea (abd cramps) for up to 5 days. , Print, Disp-20 Cap, R-0      cephALEXin (KEFLEX) 500 mg capsule Take 1 Cap by mouth three (3) times daily for 7 days. , Print, Disp-21 Cap, R-0               ED COURSE: The patients hospital course has been uncomplicated.

## 2017-11-11 NOTE — ED TRIAGE NOTES
C/o spontaneous rectal bleeding that started 20 min ago. Denies having BM or rectal penetration. +pain. LBM midnight. +N/V/D and abdominal pain.

## 2018-09-04 ENCOUNTER — OFFICE VISIT (OUTPATIENT)
Dept: OBGYN CLINIC | Age: 28
End: 2018-09-04

## 2018-09-04 VITALS
SYSTOLIC BLOOD PRESSURE: 120 MMHG | HEIGHT: 66 IN | DIASTOLIC BLOOD PRESSURE: 74 MMHG | BODY MASS INDEX: 31.18 KG/M2 | WEIGHT: 194 LBS

## 2018-09-04 DIAGNOSIS — Z01.419 ENCOUNTER FOR GYNECOLOGICAL EXAMINATION WITHOUT ABNORMAL FINDING: Primary | ICD-10-CM

## 2018-09-04 NOTE — PROGRESS NOTES
Willian Gomez is a ,  33 y.o. female [address] whose Patient's last menstrual period was 2018 (exact date). who presents for her annual checkup. She is having no significant problems. With regard to the Gardisil vaccine, she is older than the FDA approved age to receive it. Menstrual status:    Her periods are heavy in flow. She is using more than ten pads or tampons per day, usually regular every 26-30 days. She c/o dysmenorrhea. She reports no premenstrual symptoms. Contraception:    The current method of family planning is none and She declines contraception and counseling. Plans IUI with [facility] soon    Sexual history:    She  reports that she currently engages in sexual activity and has had female partners. She reports using the following method of birth control/protection: None. Medical conditions:    Since her last annual GYN exam about 2017, per patient ago, she has not the following changes in her health history: none. Pap and Mammogram History:    Her most recent Pap smear was normal obtained 17, per patient year(s) ago. The patient has never had a mammogram.    The patient does not have a family history of breast cancer. No past medical history on file. No past surgical history on file. Allergies: Latex, natural rubber   Social History     Social History    Marital status:      Spouse name: N/A    Number of children: N/A    Years of education: N/A     Occupational History    Not on file. Social History Main Topics    Smoking status: Never Smoker    Smokeless tobacco: Never Used    Alcohol use No    Drug use: Not on file    Sexual activity: Yes     Partners: Female     Birth control/ protection: None     Other Topics Concern    Not on file     Social History Narrative    No narrative on file     Tobacco History:  reports that she has never smoked.  She has never used smokeless tobacco.  Alcohol Abuse: reports that she does not drink alcohol. Drug Abuse:  has no drug history on file. There is no problem list on file for this patient.       Review of Systems - History obtained from the patient  Constitutional: negative for weight loss, fever, night sweats  HEENT: negative for hearing loss, earache, congestion, snoring, sorethroat  CV: negative for chest pain, palpitations, edema  Resp: negative for cough, shortness of breath, wheezing  GI: negative for change in bowel habits, abdominal pain, black or bloody stools  : negative for frequency, dysuria, hematuria, vaginal discharge  MSK: negative for back pain, joint pain, muscle pain  Breast: negative for breast lumps, nipple discharge, galactorrhea  Skin :negative for itching, rash, hives  Neuro: negative for dizziness, headache, confusion, weakness  Psych: negative for anxiety, depression, change in mood  Heme/lymph: negative for bleeding, bruising, pallor    Physical Exam    Visit Vitals    /74    Ht 5' 6\" (1.676 m)    Wt 194 lb (88 kg)    LMP 08/20/2018 (Exact Date)    BMI 31.31 kg/m2       Constitutional  · Appearance: well-nourished, well developed, alert, in no acute distress    HENT  · Head and Face: appears normal    Neck  · Inspection/Palpation: normal appearance, no masses or tenderness  · Lymph Nodes: no lymphadenopathy present  · Thyroid: gland size normal, nontender, no nodules or masses present on palpation    Chest  · Respiratory Effort: breathing normal  · Auscultation: normal breath sounds    Cardiovascular  · Heart:  · Auscultation: regular rate and rhythm without murmur    Breasts  · Inspection of Breasts: breasts symmetrical, no skin changes, no discharge present, nipple appearance normal, no skin retraction present  · Palpation of Breasts and Axillae: no masses present on palpation, no breast tenderness  · Axillary Lymph Nodes: no lymphadenopathy present    Gastrointestinal  · Abdominal Examination: abdomen non-tender to palpation, normal bowel sounds, no masses present  · Liver and spleen: no hepatomegaly present, spleen not palpable  · Hernias: no hernias identified    Genitourinary  · External Genitalia: normal appearance for age, no discharge present, no tenderness present, no inflammatory lesions present, no masses present, no atrophy present  · Vagina: normal vaginal vault without central or paravaginal defects, no discharge present, no inflammatory lesions present, no masses present  · Bladder: non-tender to palpation  · Urethra: appears normal  · Cervix: normal   · Uterus: normal size, shape and consistency  · Adnexa: no adnexal tenderness present, no adnexal masses present  · Perineum: perineum within normal limits, no evidence of trauma, no rashes or skin lesions present  · Anus: anus within normal limits, no hemorrhoids present  · Inguinal Lymph Nodes: no lymphadenopathy present    Skin  · General Inspection: no rash, no lesions identified    Neurologic/Psychiatric  · Mental Status:  · Orientation: grossly oriented to person, place and time  · Mood and Affect: mood normal, affect appropriate    . Assessment:  Routine gynecologic examination  Her current medical status is satisfactory with no evidence of significant gynecologic issues.   Desires conception - IUI  Plan:  Counseled re: diet, exercise, healthy lifestyle  Return for yearly wellness visits  Take vits  RTO with conception

## 2018-09-04 NOTE — PATIENT INSTRUCTIONS
Breast Self-Exam: Care Instructions  Your Care Instructions    A breast self-exam is when you check your breasts for lumps or changes. This regular exam helps you learn how your breasts normally look and feel. Most breast problems or changes are not because of cancer. Breast self-exam is not a substitute for a mammogram. Having regular breast exams by your doctor and regular mammograms improve your chances of finding any problems with your breasts. Some women set a time each month to do a step-by-step breast self-exam. Other women like a less formal system. They might look at their breasts as they brush their teeth, or feel their breasts once in a while in the shower. If you notice a change in your breast, tell your doctor. Follow-up care is a key part of your treatment and safety. Be sure to make and go to all appointments, and call your doctor if you are having problems. It's also a good idea to know your test results and keep a list of the medicines you take. How do you do a breast self-exam?  · The best time to examine your breasts is usually one week after your menstrual period begins. Your breasts should not be tender then. If you do not have periods, you might do your exam on a day of the month that is easy to remember. · To examine your breasts:  ¨ Remove all your clothes above the waist and lie down. When you are lying down, your breast tissue spreads evenly over your chest wall, which makes it easier to feel all your breast tissue. ¨ Use the pads-not the fingertips-of the 3 middle fingers of your left hand to check your right breast. Move your fingers slowly in small coin-sized circles that overlap. ¨ Use three levels of pressure to feel of all your breast tissue. Use light pressure to feel the tissue close to the skin surface. Use medium pressure to feel a little deeper. Use firm pressure to feel your tissue close to your breastbone and ribs.  Use each pressure level to feel your breast tissue before moving on to the next spot. ¨ Check your entire breast, moving up and down as if following a strip from the collarbone to the bra line, and from the armpit to the ribs. Repeat until you have covered the entire breast.  ¨ Repeat this procedure for your left breast, using the pads of the 3 middle fingers of your right hand. · To examine your breasts while in the shower:  ¨ Place one arm over your head and lightly soap your breast on that side. ¨ Using the pads of your fingers, gently move your hand over your breast (in the strip pattern described above), feeling carefully for any lumps or changes. ¨ Repeat for the other breast.  · Have your doctor inspect anything you notice to see if you need further testing. Where can you learn more? Go to http://anjali-nhan.info/. Enter P148 in the search box to learn more about \"Breast Self-Exam: Care Instructions. \"  Current as of: May 12, 2017  Content Version: 11.7  © 8014-2389 [employer]. Care instructions adapted under license by [employer] (which disclaims liability or warranty for this information). If you have questions about a medical condition or this instruction, always ask your healthcare professional. [place] [address] any warranty or liability for your use of this information.

## 2018-09-04 NOTE — LETTER
September 4, 2018 [facility] [address] [place] [id number] Dear Christi Bhagat: Thank you for requesting access to [employer]. Please follow the instructions below to securely access and download your online medical record. O[employer]rive allows you to send messages to your doctor, view your test results, renew your prescriptions, schedule appointments, and more. How Do I Sign Up? 1. In your internet browser, go to [email or web address]  
2. Click on the First Time User? Click Here link in the Sign In box. You will be redirected to the New Member Sign Up page. 3. Enter your Omnidrive Access Code exactly as it appears below. You will not need to use this code after youve completed the sign-up process. If you do not sign up before the expiration date, you must request a new code. Omnidrive Access Code: [email or web address] Expires: 12/3/2018  8:51 AM  
 
4. Enter the last four digits of your Social Security Number (xxxx) and Date of Birth (mm/dd/yyyy) as indicated and click Submit. You will be taken to the next sign-up page. 5. Create a Omnidrive ID. This will be your Omnidrive login ID and cannot be changed, so think of one that is secure and easy to remember. 6. Create a O[employer]rive password. You can change your password at any time. 7. Enter your Password Reset Question and Answer. This can be used at a later time if you forget your password. 8. Enter your e-mail address. You will receive e-mail notification when new information is available in [address]. 9. Click Sign Up. You can now view and download portions of your medical record. 10. Click the Download Summary menu link to download a portable copy of your medical information. Additional Information If you have questions, please visit the Frequently Asked Questions section of the O[employer]rive website at [email or web address] Remember, O[employer]rive is NOT to be used for urgent needs. For medical emergencies, dial 911. Now available from your iPhone and Android!  
 
Sincerely,

## 2018-09-06 LAB
CYTOLOGIST CVX/VAG CYTO: NORMAL
CYTOLOGY CVX/VAG DOC THIN PREP: NORMAL
DX ICD CODE: NORMAL
LABCORP, 190119: NORMAL
Lab: NORMAL
OTHER STN SPEC: NORMAL
PATH REPORT.FINAL DX SPEC: NORMAL
STAT OF ADQ CVX/VAG CYTO-IMP: NORMAL

## 2019-02-27 ENCOUNTER — OFFICE VISIT (OUTPATIENT)
Dept: OBGYN CLINIC | Age: 29
End: 2019-02-27

## 2019-02-27 VITALS
HEIGHT: 66 IN | DIASTOLIC BLOOD PRESSURE: 74 MMHG | SYSTOLIC BLOOD PRESSURE: 112 MMHG | WEIGHT: 198 LBS | BODY MASS INDEX: 31.82 KG/M2

## 2019-02-27 DIAGNOSIS — R10.2 PELVIC PAIN: Primary | ICD-10-CM

## 2019-02-27 LAB
BILIRUB UR QL STRIP: NEGATIVE
GLUCOSE UR-MCNC: NEGATIVE MG/DL
KETONES P FAST UR STRIP-MCNC: NORMAL MG/DL
PH UR STRIP: NORMAL [PH] (ref 4.6–8)
PROT UR QL STRIP: NORMAL
SP GR UR STRIP: NORMAL (ref 1–1.03)
UA UROBILINOGEN AMB POC: NORMAL (ref 0.2–1)
URINALYSIS CLARITY POC: CLEAR
URINALYSIS COLOR POC: YELLOW
URINE BLOOD POC: NEGATIVE
URINE LEUKOCYTES POC: NEGATIVE
URINE NITRITES POC: NEGATIVE

## 2019-02-27 RX ORDER — DOXYCYCLINE 100 MG/1
100 CAPSULE ORAL 2 TIMES DAILY
Qty: 20 CAP | Refills: 0 | Status: SHIPPED | OUTPATIENT
Start: 2019-02-27 | End: 2019-03-09

## 2019-02-27 NOTE — PROGRESS NOTES
Pelvic Pain evaluation    Rosemary Elise is a 33 y.o. female who complains of pelvic pain. The pain is described as sharp, stabbing, shooting and cramping, and is 8/10 in intensity at times. Pain is located in the suprapubic with radiation to R groin and L groin. Started with sharp pain quick across lower abdomen, now almost constant  Had US at outside facility - shows 3cm \"fluid collection\" on right but ovary not definitely seen  Pt has sex with females. No new partners    The pain started 1 month ago. Her symptoms have been rapidly worsening since. Aggravating factors: walking, movement, bowel movement. Alleviating factors: none. Associated symptoms: none. The patient denies dysuria, vomiting and vaginal pain. Ultrasound done at [address] [place] [address] 2/26/19 report scanned into chart. History reviewed. No pertinent past medical history. History reviewed. No pertinent surgical history. Social History     Occupational History    Not on file   Tobacco Use    Smoking status: Never Smoker    Smokeless tobacco: Never Used   Substance and Sexual Activity    Alcohol use: No    Drug use: No    Sexual activity: Yes     Partners: Female     Birth control/protection: None     History reviewed. No pertinent family history. Allergies   Allergen Reactions    Latex Itching    Latex, Natural Rubber Itching     Prior to Admission medications    Medication Sig Start Date End Date Taking? Authorizing Provider   doxycycline (VIBRAMYCIN) 100 mg capsule Take 1 Cap by mouth two (2) times a day for 10 days. 2/27/19 3/9/19 Yes Storm Lawrence MD   ondansetron (ZOFRAN ODT) 4 mg disintegrating tablet Take 1 Tab by mouth every eight (8) hours as needed for Nausea.  11/11/17   Skippy Hearing, DO        Review of Systems: History obtained from the patient  Constitutional: negative for weight loss, fever, night sweats  Breast: negative for breast lumps, nipple discharge, galactorrhea  GI: negative for change in bowel habits, abdominal pain, black or bloody stools  : negative for frequency, dysuria, hematuria, vaginal discharge  MSK: negative for back pain, joint pain, muscle pain  Skin: negative for itching, rash, hives  Psych: negative for anxiety, depression, change in mood      Objective:    Visit Vitals  /74   Ht 5' 6\" (1.676 m)   Wt 198 lb (89.8 kg)   LMP 02/17/2019 (Approximate)   BMI 31.96 kg/m²       Physical Exam:     Constitutional  · Appearance: well-nourished, well developed, alert, in no acute distress    Gastrointestinal  · Abdominal Examination: abdomen non-tender to palpation, normal bowel sounds, no masses present  · Liver and spleen: no hepatomegaly present, spleen not palpable  · Hernias: no hernias identified    Genitourinary  · External Genitalia: normal appearance for age, no discharge present, no tenderness present, no inflammatory lesions present, no masses present, no atrophy present  · Vagina: normal vaginal vault without central or paravaginal defects, yellow discharge present, no inflammatory lesions present, no masses present  · Bladder: non-tender to palpation  · Urethra: appears normal  · Cervix: normal, pos CMT   · Uterus: normal size, shape and consistency  · Adnexa: pos adnexal tenderness present, no adnexal masses present  · Perineum: perineum within normal limits, no evidence of trauma, no rashes or skin lesions present  · Anus: anus within normal limits, no hemorrhoids present  · Inguinal Lymph Nodes: no lymphadenopathy present    Skin  · General Inspection: no rash, no lesions identified    Neurologic/Psychiatric  · Mental Status:  · Orientation: grossly oriented to person, place and time  · Mood and Affect: mood normal, affect appropriate  Results for orders placed or performed in visit on 02/27/19   AMB POC URINALYSIS DIP STICK MANUAL W/O MICRO   Result Value Ref Range    Color (UA POC) Yellow     Clarity (UA POC) Clear     Glucose (UA POC) Negative Negative    Bilirubin (UA POC) Negative Negative    Ketones (UA POC) Trace Negative    Specific gravity (UA POC)  1.001 - 1.035    Blood (UA POC) Negative Negative    pH (UA POC)  4.6 - 8.0    Protein (UA POC) Trace Negative    Urobilinogen (UA POC) normal 0.2 - 1    Nitrites (UA POC) Negative Negative    Leukocyte esterase (UA POC) Negative Negative       Assessment:  Pelvic pain  US essentially normal  Exam suspicious for infection though min risk factors    Plan:   Nuswab plus  advil  Repeat US in 2 weeks  Doxycycline 100mg bid x 10d  Urine culture      RTO prn if symptoms persist or worsen. Instructions given to pt. Handouts given to pt.

## 2019-03-01 LAB
A VAGINAE DNA VAG QL NAA+PROBE: ABNORMAL SCORE
BACTERIA UR CULT: ABNORMAL
BACTERIA UR CULT: ABNORMAL
BVAB2 DNA VAG QL NAA+PROBE: ABNORMAL SCORE
C ALBICANS DNA VAG QL NAA+PROBE: NEGATIVE
C GLABRATA DNA VAG QL NAA+PROBE: NEGATIVE
C TRACH RRNA SPEC QL NAA+PROBE: NEGATIVE
MEGA1 DNA VAG QL NAA+PROBE: ABNORMAL SCORE
N GONORRHOEA RRNA SPEC QL NAA+PROBE: NEGATIVE
T VAGINALIS RRNA SPEC QL NAA+PROBE: NEGATIVE

## 2019-03-01 RX ORDER — METRONIDAZOLE 500 MG/1
500 TABLET ORAL 2 TIMES DAILY
Qty: 14 TAB | Refills: 0 | Status: SHIPPED | OUTPATIENT
Start: 2019-03-01 | End: 2019-03-08

## 2019-03-04 NOTE — PROGRESS NOTES
Pt notified of lab results and Drs recommendations. Lab only appt scheduled for 3/8/19 for repeat urine culture.

## 2019-03-04 NOTE — PROGRESS NOTES
Pt notified of urine culture results through [employer]. Sent another message to schedule urine check.

## 2019-03-08 ENCOUNTER — LAB ONLY (OUTPATIENT)
Dept: OBGYN CLINIC | Age: 29
End: 2019-03-08

## 2019-03-08 DIAGNOSIS — Z87.440 HISTORY OF UTI: Primary | ICD-10-CM

## 2019-03-10 LAB — BACTERIA UR CULT: NORMAL

## 2019-03-15 ENCOUNTER — OFFICE VISIT (OUTPATIENT)
Dept: OBGYN CLINIC | Age: 29
End: 2019-03-15

## 2019-03-15 VITALS
SYSTOLIC BLOOD PRESSURE: 104 MMHG | WEIGHT: 198 LBS | DIASTOLIC BLOOD PRESSURE: 72 MMHG | HEIGHT: 66 IN | BODY MASS INDEX: 31.82 KG/M2

## 2019-03-15 DIAGNOSIS — N70.11 HYDROSALPINX: ICD-10-CM

## 2019-03-15 DIAGNOSIS — R10.2 PELVIC PAIN: Primary | ICD-10-CM

## 2019-03-15 NOTE — PROGRESS NOTES
Pelvic Pain evaluation    Vivienne Johnson is a 33 y.o. female who complains of pelvic pain. She is here for a 2 week follow up. +UTI on 2/27, completed meds. Repeat urine culture  3/8, negative. Ultrsaound today revealed:  UTERUS IS ANTEVERTED, NORMAL IN SIZE AND ECHOGENICITY. ENDOMETRIUM MEASURES 9-10MM IN THICKNESS. NO EVIDENCE OF MASS OR ABNORMALITY SEEN  WITHIN THE ENDOMETRIAL CAVITY. RIGHT OVARY APPEARS WITHIN NORMAL LIMITS. LEFT OVARY APPEARS WITHIN NORMAL LIMITS. A FOLLICULAR CYST IS SEEN. THERE APPEARS TO BE LEFT SIDED HYDROSALPINX. NO FREE FLUID SEEN IN THE CDS.     pain is described as sharp, stabbing, shooting and cramping, and is 8/10 in intensity at times. Pain is located in the suprapubic with radiation to R groin and L groin. Started with sharp pain quick across lower abdomen, now almost constant  Had US at outside facility - shows 3cm \"fluid collection\" on right but ovary not definitely seen  Pt has sex with females. No new partners     The pain started 6 weeks month ago. Her symptoms have been rapidly worsening since. Aggravating factors: walking, movement, bowel movement. Alleviating factors: none. Associated symptoms: none. The patient denies dysuria, vomiting and vaginal pain. Pt was apparently seen by [facility] for consultation. Has been doing her own self administered inseminations at home and has not conceived. Has hx of chlamydia in the past. Left tube appears to have hydrosalpinx.     Ultrasound done at [address] [place] [address] 2/26/19 report scanned into chart. History reviewed. No pertinent past medical history. History reviewed. No pertinent surgical history.   Social History     Occupational History    Not on file   Tobacco Use    Smoking status: Never Smoker    Smokeless tobacco: Never Used   Substance and Sexual Activity    Alcohol use: No    Drug use: No    Sexual activity: Yes     Partners: Female     Birth control/protection: None     History reviewed. No pertinent family history. Allergies   Allergen Reactions    Latex Itching    Latex, Natural Rubber Itching     Prior to Admission medications    Medication Sig Start Date End Date Taking? Authorizing Provider   ondansetron (ZOFRAN ODT) 4 mg disintegrating tablet Take 1 Tab by mouth every eight (8) hours as needed for Nausea. 11/11/17   Hunter Pillion, DO        Review of Systems: History obtained from the patient  Constitutional: negative for weight loss, fever, night sweats  Breast: negative for breast lumps, nipple discharge, galactorrhea  GI: negative for change in bowel habits, abdominal pain, black or bloody stools  : negative for frequency, dysuria, hematuria, vaginal discharge  MSK: negative for back pain, joint pain, muscle pain  Skin: negative for itching, rash, hives  Psych: negative for anxiety, depression, change in mood      Objective:    Visit Vitals  /72   Ht 5' 6\" (1.676 m)   Wt 198 lb (89.8 kg)   LMP 02/17/2019 (Exact Date)   BMI 31.96 kg/m²       Physical Exam:     Constitutional  · Appearance: well-nourished, well developed, alert, in no acute distress    Skin  · General Inspection: no rash, no lesions identified    Neurologic/Psychiatric  · Mental Status:  · Orientation: grossly oriented to person, place and time  · Mood and Affect: mood normal, affect appropriate    Assessment:  Likely left hydrosalpinx - chronic  Persistent pelvic pain - repeat urine cx neg, GCC neg, has taken course of doxy    Plan: Fu with PCP  See [facility] - likely needs an HSG - advised chlamydia is a known cause of tubal damage and there may be additional damage to other tube not visible on ultrasound   Pt advised there is risk involved in doing insemination from an untested person - could get an STD      RTO prn if symptoms persist or worsen. Instructions given to pt. Handouts given to pt.     15 minutes was spent face to face with patient and >50% was spent counseling

## 2019-05-03 ENCOUNTER — HOSPITAL ENCOUNTER (OUTPATIENT)
Dept: MRI IMAGING | Age: 29
Discharge: HOME | End: 2019-05-03
Attending: ORTHOPAEDIC SURGERY
Payer: COMMERCIAL

## 2019-05-03 DIAGNOSIS — M75.02 ADHESIVE CAPSULITIS OF LEFT SHOULDER: ICD-10-CM

## 2019-05-03 DIAGNOSIS — S43.432D SUPERIOR GLENOID LABRUM LESION OF LEFT SHOULDER, SUBSEQUENT ENCOUNTER: ICD-10-CM

## 2019-05-03 PROCEDURE — 73221 MRI JOINT UPR EXTREM W/O DYE: CPT

## 2019-07-02 ENCOUNTER — APPOINTMENT (OUTPATIENT)
Dept: CT IMAGING | Age: 29
End: 2019-07-02
Attending: PHYSICIAN ASSISTANT
Payer: COMMERCIAL

## 2019-07-02 ENCOUNTER — HOSPITAL ENCOUNTER (EMERGENCY)
Age: 29
Discharge: HOME | End: 2019-07-02
Attending: EMERGENCY MEDICINE
Payer: COMMERCIAL

## 2019-07-02 VITALS
HEART RATE: 100 BPM | RESPIRATION RATE: 15 BRPM | TEMPERATURE: 98.6 F | WEIGHT: 198 LBS | DIASTOLIC BLOOD PRESSURE: 76 MMHG | BODY MASS INDEX: 31.82 KG/M2 | HEIGHT: 66 IN | SYSTOLIC BLOOD PRESSURE: 114 MMHG | OXYGEN SATURATION: 99 %

## 2019-07-02 DIAGNOSIS — R10.84 ABDOMINAL PAIN, GENERALIZED: Primary | ICD-10-CM

## 2019-07-02 DIAGNOSIS — K59.00 CONSTIPATION, UNSPECIFIED CONSTIPATION TYPE: ICD-10-CM

## 2019-07-02 LAB
ALBUMIN SERPL-MCNC: 4.2 G/DL (ref 3.5–5)
ALBUMIN/GLOB SERPL: 1 {RATIO} (ref 1.1–2.2)
ALP SERPL-CCNC: 92 U/L (ref 45–117)
ALT SERPL-CCNC: 28 U/L (ref 12–78)
ANION GAP SERPL CALC-SCNC: 7 MMOL/L (ref 5–15)
APPEARANCE UR: CLEAR
AST SERPL-CCNC: 16 U/L (ref 15–37)
BACTERIA URNS QL MICRO: NEGATIVE /HPF
BASOPHILS # BLD: 0 K/UL (ref 0–0.1)
BASOPHILS NFR BLD: 0 % (ref 0–1)
BILIRUB SERPL-MCNC: 0.3 MG/DL (ref 0.2–1)
BILIRUB UR QL CFM: NEGATIVE
BUN SERPL-MCNC: 10 MG/DL (ref 6–20)
BUN/CREAT SERPL: 11 (ref 12–20)
CALCIUM SERPL-MCNC: 8.8 MG/DL (ref 8.5–10.1)
CHLORIDE SERPL-SCNC: 107 MMOL/L (ref 97–108)
CO2 SERPL-SCNC: 25 MMOL/L (ref 21–32)
COLOR UR: ABNORMAL
COMMENT, HOLDF: NORMAL
CREAT SERPL-MCNC: 0.94 MG/DL (ref 0.55–1.02)
DIFFERENTIAL METHOD BLD: NORMAL
EOSINOPHIL # BLD: 0.2 K/UL (ref 0–0.4)
EOSINOPHIL NFR BLD: 3 % (ref 0–7)
EPITH CASTS URNS QL MICRO: ABNORMAL /LPF
ERYTHROCYTE [DISTWIDTH] IN BLOOD BY AUTOMATED COUNT: 11.7 % (ref 11.5–14.5)
GLOBULIN SER CALC-MCNC: 4.1 G/DL (ref 2–4)
GLUCOSE SERPL-MCNC: 92 MG/DL (ref 65–100)
GLUCOSE UR STRIP.AUTO-MCNC: NEGATIVE MG/DL
HCG UR QL: NEGATIVE
HCT VFR BLD AUTO: 37.6 % (ref 35–47)
HGB BLD-MCNC: 12.8 G/DL (ref 11.5–16)
HGB UR QL STRIP: NEGATIVE
IMM GRANULOCYTES # BLD AUTO: 0 K/UL (ref 0–0.04)
IMM GRANULOCYTES NFR BLD AUTO: 0 % (ref 0–0.5)
KETONES UR QL STRIP.AUTO: ABNORMAL MG/DL
LEUKOCYTE ESTERASE UR QL STRIP.AUTO: ABNORMAL
LYMPHOCYTES # BLD: 1.8 K/UL (ref 0.8–3.5)
LYMPHOCYTES NFR BLD: 26 % (ref 12–49)
MCH RBC QN AUTO: 29.3 PG (ref 26–34)
MCHC RBC AUTO-ENTMCNC: 34 G/DL (ref 30–36.5)
MCV RBC AUTO: 86 FL (ref 80–99)
MONOCYTES # BLD: 0.5 K/UL (ref 0–1)
MONOCYTES NFR BLD: 7 % (ref 5–13)
MUCOUS THREADS URNS QL MICRO: ABNORMAL /LPF
NEUTS SEG # BLD: 4.6 K/UL (ref 1.8–8)
NEUTS SEG NFR BLD: 64 % (ref 32–75)
NITRITE UR QL STRIP.AUTO: NEGATIVE
NRBC # BLD: 0 K/UL (ref 0–0.01)
NRBC BLD-RTO: 0 PER 100 WBC
PH UR STRIP: 5.5 [PH] (ref 5–8)
PLATELET # BLD AUTO: 336 K/UL (ref 150–400)
PMV BLD AUTO: 9.3 FL (ref 8.9–12.9)
POTASSIUM SERPL-SCNC: 3.7 MMOL/L (ref 3.5–5.1)
PROT SERPL-MCNC: 8.3 G/DL (ref 6.4–8.2)
PROT UR STRIP-MCNC: NEGATIVE MG/DL
RBC # BLD AUTO: 4.37 M/UL (ref 3.8–5.2)
RBC #/AREA URNS HPF: ABNORMAL /HPF (ref 0–5)
SAMPLES BEING HELD,HOLD: NORMAL
SODIUM SERPL-SCNC: 139 MMOL/L (ref 136–145)
SP GR UR REFRACTOMETRY: 1.03 (ref 1–1.03)
UA: UC IF INDICATED,UAUC: ABNORMAL
UROBILINOGEN UR QL STRIP.AUTO: 0.2 EU/DL (ref 0.2–1)
WBC # BLD AUTO: 7.2 K/UL (ref 3.6–11)
WBC URNS QL MICRO: ABNORMAL /HPF (ref 0–4)

## 2019-07-02 PROCEDURE — 74011250636 HC RX REV CODE- 250/636: Performed by: PHYSICIAN ASSISTANT

## 2019-07-02 PROCEDURE — 99283 EMERGENCY DEPT VISIT LOW MDM: CPT

## 2019-07-02 PROCEDURE — 96375 TX/PRO/DX INJ NEW DRUG ADDON: CPT

## 2019-07-02 PROCEDURE — 74011636320 HC RX REV CODE- 636/320: Performed by: STUDENT IN AN ORGANIZED HEALTH CARE EDUCATION/TRAINING PROGRAM

## 2019-07-02 PROCEDURE — 81001 URINALYSIS AUTO W/SCOPE: CPT

## 2019-07-02 PROCEDURE — 96374 THER/PROPH/DIAG INJ IV PUSH: CPT

## 2019-07-02 PROCEDURE — 36415 COLL VENOUS BLD VENIPUNCTURE: CPT

## 2019-07-02 PROCEDURE — 81025 URINE PREGNANCY TEST: CPT

## 2019-07-02 PROCEDURE — 85025 COMPLETE CBC W/AUTO DIFF WBC: CPT

## 2019-07-02 PROCEDURE — 80053 COMPREHEN METABOLIC PANEL: CPT

## 2019-07-02 PROCEDURE — 74177 CT ABD & PELVIS W/CONTRAST: CPT

## 2019-07-02 PROCEDURE — 96361 HYDRATE IV INFUSION ADD-ON: CPT

## 2019-07-02 RX ORDER — KETOROLAC TROMETHAMINE 30 MG/ML
15 INJECTION, SOLUTION INTRAMUSCULAR; INTRAVENOUS
Status: COMPLETED | OUTPATIENT
Start: 2019-07-02 | End: 2019-07-02

## 2019-07-02 RX ORDER — ONDANSETRON 4 MG/1
4 TABLET, ORALLY DISINTEGRATING ORAL
Qty: 10 TAB | Refills: 0 | Status: SHIPPED | OUTPATIENT
Start: 2019-07-02 | End: 2020-10-25

## 2019-07-02 RX ORDER — ONDANSETRON 2 MG/ML
4 INJECTION INTRAMUSCULAR; INTRAVENOUS
Status: COMPLETED | OUTPATIENT
Start: 2019-07-02 | End: 2019-07-02

## 2019-07-02 RX ADMIN — SODIUM CHLORIDE 1000 ML: 900 INJECTION, SOLUTION INTRAVENOUS at 21:55

## 2019-07-02 RX ADMIN — IOPAMIDOL 100 ML: 755 INJECTION, SOLUTION INTRAVENOUS at 21:34

## 2019-07-02 RX ADMIN — KETOROLAC TROMETHAMINE 15 MG: 30 INJECTION, SOLUTION INTRAMUSCULAR at 21:55

## 2019-07-02 RX ADMIN — ONDANSETRON 4 MG: 2 INJECTION INTRAMUSCULAR; INTRAVENOUS at 21:54

## 2019-07-02 NOTE — LETTER
Henrique Bah 
[facility] EMERGENCY DEPT 
[address] [place] [address] [phone] Work/School Note Date: 7/2/2019 To Whom It May concern: 
 
Amaury Ring was seen and treated today in the emergency room by the following provider(s): 
Attending Provider: Antonio Locke MD 
Physician Assistant: RENZO Michel. Amaury Ring may return to work on 7/5/19. Sincerely, Annabelle Nicolas MD

## 2019-07-02 NOTE — ED TRIAGE NOTES
Pt here for lower abd pain, worse on right, starting yesterday but progressively worse today with nausea.

## 2019-07-03 NOTE — DISCHARGE INSTRUCTIONS
Patient Education     - return for new or worsening symptoms; worsening pain, fever, persistent vomiting  - make a primary care or GI (Dr. Juanito Hernández) follow up appointment  - tomorrow morning on an empty stomach, mix 8 caps of Miralax in 64 ounces of a drink of your choice and drink a glass at a time over 1-2 hours  - increase water and fiber intake     Abdominal Pain: Care Instructions  Your Care Instructions    Abdominal pain has many possible causes. Some aren't serious and get better on their own in a few days. Others need more testing and treatment. If your pain continues or gets worse, you need to be rechecked and may need more tests to find out what is wrong. You may need surgery to correct the problem. Don't ignore new symptoms, such as fever, nausea and vomiting, urination problems, pain that gets worse, and dizziness. These may be signs of a more serious problem. Your doctor may have recommended a follow-up visit in the next 8 to 12 hours. If you are not getting better, you may need more tests or treatment. The doctor has checked you carefully, but problems can develop later. If you notice any problems or new symptoms, get medical treatment right away. Follow-up care is a key part of your treatment and safety. Be sure to make and go to all appointments, and call your doctor if you are having problems. It's also a good idea to know your test results and keep a list of the medicines you take. How can you care for yourself at home? · Rest until you feel better. · To prevent dehydration, drink plenty of fluids, enough so that your urine is light yellow or clear like water. Choose water and other caffeine-free clear liquids until you feel better. If you have kidney, heart, or liver disease and have to limit fluids, talk with your doctor before you increase the amount of fluids you drink. · If your stomach is upset, eat mild foods, such as rice, dry toast or crackers, bananas, and applesauce.  Try eating several small meals instead of two or three large ones. · Wait until 48 hours after all symptoms have gone away before you have spicy foods, alcohol, and drinks that contain caffeine. · Do not eat foods that are high in fat. · Avoid anti-inflammatory medicines such as aspirin, ibuprofen (Advil, Motrin), and naproxen (Aleve). These can cause stomach upset. Talk to your doctor if you take daily aspirin for another health problem. When should you call for help? Call 911 anytime you think you may need emergency care. For example, call if:    · You passed out (lost consciousness).     · You pass maroon or very bloody stools.     · You vomit blood or what looks like coffee grounds.     · You have new, severe belly pain.    Call your doctor now or seek immediate medical care if:    · Your pain gets worse, especially if it becomes focused in one area of your belly.     · You have a new or higher fever.     · Your stools are black and look like tar, or they have streaks of blood.     · You have unexpected vaginal bleeding.     · You have symptoms of a urinary tract infection. These may include:  ? Pain when you urinate. ? Urinating more often than usual.  ? Blood in your urine.     · You are dizzy or lightheaded, or you feel like you may faint.    Watch closely for changes in your health, and be sure to contact your doctor if:    · You are not getting better after 1 day (24 hours). Where can you learn more? Go to http://anjali-nhan.info/. Enter L008 in the search box to learn more about \"Abdominal Pain: Care Instructions. \"  Current as of: September 23, 2018  Content Version: 11.9  © 6950-9243 [employer]. Care instructions adapted under license by [employer] (which disclaims liability or warranty for this information).  If you have questions about a medical condition or this instruction, always ask your healthcare professional. [employer] any warranty or liability for your use of this information. Patient Education        Constipation: Care Instructions  Your Care Instructions    Constipation means that you have a hard time passing stools (bowel movements). People pass stools from 3 times a day to once every 3 days. What is normal for you may be different. Constipation may occur with pain in the rectum and cramping. The pain may get worse when you try to pass stools. Sometimes there are small amounts of bright red blood on toilet paper or the surface of stools. This is because of enlarged veins near the rectum (hemorrhoids). A few changes in your diet and lifestyle may help you avoid ongoing constipation. Your doctor may also prescribe medicine to help loosen your stool. Some medicines can cause constipation. These include pain medicines and antidepressants. Tell your doctor about all the medicines you take. Your doctor may want to make a medicine change to ease your symptoms. Follow-up care is a key part of your treatment and safety. Be sure to make and go to all appointments, and call your doctor if you are having problems. It's also a good idea to know your test results and keep a list of the medicines you take. How can you care for yourself at home? · Drink plenty of fluids, enough so that your urine is light yellow or clear like water. If you have kidney, heart, or liver disease and have to limit fluids, talk with your doctor before you increase the amount of fluids you drink. · Include high-fiber foods in your diet each day. These include fruits, vegetables, beans, and whole grains. · Get at least 30 minutes of exercise on most days of the week. Walking is a good choice. You also may want to do other activities, such as running, swimming, cycling, or playing tennis or team sports. · Take a fiber supplement, such as Citrucel or Metamucil, every day. Read and follow all instructions on the label.   · Schedule time each day for a bowel movement. A daily routine may help. Take your time having your bowel movement. · Support your feet with a small step stool when you sit on the toilet. This helps flex your hips and places your pelvis in a squatting position. · Your doctor may recommend an over-the-counter laxative to relieve your constipation. Examples are Milk of Magnesia and MiraLax. Read and follow all instructions on the label. Do not use laxatives on a long-term basis. When should you call for help? Call your doctor now or seek immediate medical care if:    · You have new or worse belly pain.     · You have new or worse nausea or vomiting.     · You have blood in your stools.    Watch closely for changes in your health, and be sure to contact your doctor if:    · Your constipation is getting worse.     · You do not get better as expected. Where can you learn more? Go to http://anjali-nhan.info/. Enter 21 682.989.1672 in the search box to learn more about \"Constipation: Care Instructions. \"  Current as of: September 23, 2018  Content Version: 11.9  © 5500-6944 [employer]. Care instructions adapted under license by [employer] (which disclaims liability or warranty for this information). If you have questions about a medical condition or this instruction, always ask your healthcare professional. [place] [address] any warranty or liability for your use of this information.

## 2019-07-03 NOTE — ED PROVIDER NOTES
26 y.o. female with past medical history significant for asthma who presents via private vehicle with chief complaint of abdominal pain. Pt reports that she has had waxing and waning \"stabbing\" (R>L) lower abdominal pain today. She states that she has radiation of pain to her back and that the abdominal pain worsens when she sits. Pt denies use of pain medication PTA. Pt mentions that she has accompanying nausea with her abdominal pain and that she has only had one meal today. She notes that she has had similar pain several times but intermittently 2 to 3 months ago and was seen at Urgent Care then by an OB/GYN for possible issue with her ovaries. She states that she had no significant findings during these visits, especially an unremarkable abdominal/pelvic US, while getting care at the Urgent Care and at her OB/GYN. Pt mentions that her abdominal pain resolved since 2 to 3 months ago only to return today. Pt states that her LMP was about 8 days ago. Pt denies abdominal surgeries. Pt deneis allergies to medications. Pt denies h/o DM. Pt denies hematuria, dysuria, vaginal discharge, vaginal bleeding, diarrhea, or constipation. There are no other acute medical concerns at this time. Social hx: Denies tobacco use; Social EtOH use; Denies illicit drug use  PCP: UNKNOWN    Note written by Barb Case, as dictated by RENZO Lazaro 8:08 PM    The history is provided by the patient. No  was used. No past medical history on file. No past surgical history on file. No family history on file.     Social History     Socioeconomic History    Marital status:      Spouse name: Not on file    Number of children: Not on file    Years of education: Not on file    Highest education level: Not on file   Occupational History    Not on file   Social Needs    Financial resource strain: Not on file    Food insecurity:     Worry: Not on file     Inability: Not on file  Transportation needs:     Medical: Not on file     Non-medical: Not on file   Tobacco Use    Smoking status: Never Smoker    Smokeless tobacco: Never Used   Substance and Sexual Activity    Alcohol use: No    Drug use: No    Sexual activity: Yes     Partners: Female     Birth control/protection: None   Lifestyle    Physical activity:     Days per week: Not on file     Minutes per session: Not on file    Stress: Not on file   Relationships    Social connections:     Talks on phone: Not on file     Gets together: Not on file     Attends [employer] service: Not on file     Active member of club or organization: Not on file     Attends meetings of clubs or organizations: Not on file     Relationship status: Not on file    Intimate partner violence:     Fear of current or ex partner: Not on file     Emotionally abused: Not on file     Physically abused: Not on file     Forced sexual activity: Not on file   Other Topics Concern    Not on file   Social History Narrative    ** Merged History Encounter **              ALLERGIES: Latex and Latex, natural rubber    Review of Systems   Constitutional: Negative for fever. HENT: Negative for trouble swallowing. Respiratory: Negative for shortness of breath. Cardiovascular: Negative for chest pain. Gastrointestinal: Positive for abdominal pain and nausea. Negative for constipation and diarrhea. Genitourinary: Negative for dysuria, hematuria, pelvic pain, vaginal bleeding and vaginal discharge. Musculoskeletal: Negative for neck stiffness. Skin: Negative for rash. Neurological: Negative for seizures. All other systems reviewed and are negative. Vitals:    07/02/19 1958 07/02/19 2150   BP: 114/76    Pulse: 100    Resp: 15    Temp: 98.6 °F (37 °C)    SpO2: 97% 99%   Weight: 89.8 kg (198 lb)    Height: 5' 6\" (1.676 m)             Physical Exam   Constitutional: She is oriented to person, place, and time.  She appears well-developed and well-nourished. No distress. Pleasant AA female, appears uncomfortable   HENT:   Head: Normocephalic and atraumatic. Right Ear: External ear normal.   Left Ear: External ear normal.   Eyes: Conjunctivae are normal. No scleral icterus. Neck: Neck supple. No tracheal deviation present. Cardiovascular: Normal rate, regular rhythm and normal heart sounds. Exam reveals no gallop and no friction rub. No murmur heard. Pulmonary/Chest: Effort normal and breath sounds normal. No stridor. No respiratory distress. She has no wheezes. Abdominal: Soft. She exhibits no distension. Periumbilical TTP, R>L  No guarding   Musculoskeletal: Normal range of motion. Neurological: She is alert and oriented to person, place, and time. Skin: Skin is warm and dry. Psychiatric: She has a normal mood and affect. Her behavior is normal.   Nursing note and vitals reviewed. MDM  Number of Diagnoses or Management Options  Abdominal pain, generalized:   Constipation, unspecified constipation type:   Diagnosis management comments: 26year old female presenting to the ED for abdominal pain, nausea. No vomiting or bowl changes. No fever. Mild TTP on exam.  Larbs, UA, CT largely unremarkable, pt with moderate stool burden noted on CT. Discussed with pt that pain could be related to constipation. Discussed clean out regimen at home, return precautions and GI follow up given.        Amount and/or Complexity of Data Reviewed  Clinical lab tests: ordered and reviewed  Tests in the radiology section of CPT®: ordered and reviewed  Discuss the patient with other providers: yes (Dr. Mohinder Goff ED attending)           Procedures

## 2020-06-17 ENCOUNTER — VIRTUAL VISIT (OUTPATIENT)
Dept: NEUROLOGY | Age: 30
End: 2020-06-17

## 2020-06-17 ENCOUNTER — TELEPHONE (OUTPATIENT)
Dept: NEUROLOGY | Age: 30
End: 2020-06-17

## 2020-06-17 DIAGNOSIS — R51.9 INTRACTABLE EPISODIC HEADACHE, UNSPECIFIED HEADACHE TYPE: Primary | ICD-10-CM

## 2020-06-17 RX ORDER — SUMATRIPTAN 25 MG/1
TABLET, FILM COATED ORAL
COMMUNITY
Start: 2020-06-15 | End: 2020-10-25

## 2020-06-17 RX ORDER — LINACLOTIDE 145 UG/1
CAPSULE, GELATIN COATED ORAL
COMMUNITY
Start: 2020-06-11 | End: 2020-11-20

## 2020-06-17 RX ORDER — DICLOFENAC POTASSIUM 50 MG/1
50 POWDER, FOR SOLUTION ORAL AS NEEDED
Qty: 2 PACKET | Refills: 0 | Status: SHIPPED | COMMUNITY
Start: 2020-06-17 | End: 2020-10-25

## 2020-06-17 NOTE — TELEPHONE ENCOUNTER
I had called patient multiple times to check her in for her virtual visit this afternoon. Keila Kevin was able to get in touch with her and she was seen by Dr. Katrinka Boas.

## 2020-06-17 NOTE — PROGRESS NOTES
No PCP or referring doctor. Had an tele appt with her insurance and they suggested she reach out to neurology. Headaches 2-3 times per week lasting 24-48 hours. Current headache has lasted 9-10 day. Tried imitrex and that helped dull the pain, but headache is still there.

## 2020-06-17 NOTE — PROGRESS NOTES
Consent: Sam Lara, who was seen by synchronous (real-time) audio-video technology, and/or her healthcare decision maker, is aware that this patient-initiated, Telehealth encounter on 6/17/2020 is a billable service, with coverage as determined by her insurance carrier. She is aware that she may receive a bill and has provided verbal consent to proceed: Yes. CHIEF COMPLAINT:  This is Sam Lara is a 31 y.o. female right handed work from [employer]  who had concerns including Migraine (virtual visit--new patient c/o migraines). HPI:     Since middle school, patient has been having headaches, behind the L eye, throbbing, 8/10, occurring 1/ week, lasting 1-2 days, no triggers, Ibuprofen and Tylenol will dull the pain, (+) nausea (-) vomiting (+) photophobia (+) phonophobia (-) visual auras    Patient saw PCP and tried Imitrex with some benefit        ROS   (-) fever  (-) rash  All other systems reviewed and are negative    PMH  Past Medical History:   Diagnosis Date    Headache    IBS    Social Hx  Social History     Socioeconomic History    Marital status:      Spouse name: Not on file    Number of children: Not on file    Years of education: Not on file    Highest education level: Not on file   Tobacco Use    Smoking status: Never Smoker    Smokeless tobacco: Never Used   Substance and Sexual Activity    Alcohol use: No    Drug use: No    Sexual activity: Yes     Partners: Female     Birth control/protection: None   Social History Narrative    ** Merged History Encounter **            Family Hx  History reviewed. No pertinent family history.   Chiar malformation - mother  Headaches - mother    ALLERGIES  Allergies   Allergen Reactions    Latex Itching    Latex, Natural Rubber Itching       CURRENT MEDS  Current Outpatient Medications   Medication Sig Dispense Refill    Linzess 145 mcg cap capsule       SUMAtriptan (IMITREX) 25 mg tablet       ondansetron (ZOFRAN ODT) 4 mg disintegrating tablet Take 1 Tab by mouth every eight (8) hours as needed for Nausea. 10 Tab 0           PREVIOUS WORKUP  IMAGING:    CT Results (recent):  Results from Hospital Encounter encounter on 07/02/19   CT ABD PELV W CONT    Narrative EXAM:  CT abdomen pelvis with contrast    INDICATION: Abdominal pain. COMPARISON: CT 11/11/2017. TECHNIQUE: Helical CT of the abdomen  and pelvis  following the uneventful  intravenous administration of nonionic contrast.  Coronal and sagittal reformats  are performed. Delayed axial CT of the abdomen is performed. CT dose reduction  was achieved through use of a standardized protocol tailored for this  examination and automatic exposure control for dose modulation. FINDINGS:   The visualized lung bases demonstrate no mass or consolidation. The heart size  is normal. There is no pericardial or pleural effusion. Liver hemangiomas are again noted. The spleen, pancreas, and adrenal glands are  normal. The gall bladder is present  without intra- or extra-hepatic biliary  dilatation. The kidneys are symmetric without hydronephrosis. There is a moderate to large amount of colonic stool. There are no dilated bowel  loops. The appendix is unremarkable. There are no enlarged lymph nodes. There is no free fluid or free air. The  aorta tapers without aneurysm. The urinary bladder is normal. An anterior fibroid measures 1.1 cm. There is no  adnexal mass. The bony structures are age-appropriate. Impression IMPRESSION:   Moderate to large amount of colonic stool. No bowel obstruction. No other acute  abnormality in the abdomen or pelvis. MRI Results (recent):  Results from [place] encounter on 05/03/19   MRI SHOULDER LT WO CONT    Narrative EXAM: MRI SHOULDER LT WO CONT    INDICATION: Left shoulder pain. Adhesive capsulitis.     COMPARISON: None    TECHNIQUE: Axial proton density fat-saturated; oblique coronal T1, T2  fat-saturated, and proton density fat-saturated; and oblique sagittal T2  fat-saturated MRI of the left shoulder . The scan was performed on the open 0.7  Allyssa magnet. CONTRAST: None. FINDINGS: A.C. joint: Intact. Anterior acromion process type: 1    Bone marrow: Incidental red marrow reconversion is seen in the humeral  metadiaphysis and in the scapula. No acute fracture, dislocation, or marrow  replacing process. Joint fluid: None. Rotator cuff tendons: Intact. Biceps tendon: Intact and located within the bicipital groove. Muscles: No edema or atrophy. Glenoid labrum: Intact. Glenohumeral joint capsule: within normal limits. Glenohumeral articular cartilage: Intact. No focal osteochondral lesion. Soft tissue mass: None. Impression IMPRESSION: No evidence of internal derangement of the shoulder. IR Results (recent):  No results found for this or any previous visit. VAS/US Results (recent):  No results found for this or any previous visit. (I personally reviewed these images in PACS and this is my impression)    LABS (reviewed)    No visits with results within 3 Month(s) from this visit.    Latest known visit with results is:   Admission on 07/02/2019, Discharged on 07/02/2019   Component Date Value Ref Range Status    Color 07/02/2019 YELLOW/STRAW    Final    Color Reference Range: Straw, Yellow or Dark Yellow    Appearance 07/02/2019 CLEAR  CLEAR   Final    Specific gravity 07/02/2019 1.029  1.003 - 1.030   Final    pH (UA) 07/02/2019 5.5  5.0 - 8.0   Final    Protein 07/02/2019 NEGATIVE   NEG mg/dL Final    Glucose 07/02/2019 NEGATIVE   NEG mg/dL Final    Ketone 07/02/2019 TRACE* NEG mg/dL Final    Blood 07/02/2019 NEGATIVE   NEG   Final    Urobilinogen 07/02/2019 0.2  0.2 - 1.0 EU/dL Final    Nitrites 07/02/2019 NEGATIVE   NEG   Final    Leukocyte Esterase 07/02/2019 SMALL* NEG   Final    WBC 07/02/2019 0-4  0 - 4 /hpf Final    RBC 07/02/2019 0-5  0 - 5 /hpf Final    Epithelial cells 07/02/2019 FEW  FEW /lpf Final    Epithelial cell category consists of squamous cells and /or transitional urothelial cells. Renal tubular cells, if present, are separately identified as such.  Bacteria 07/02/2019 NEGATIVE   NEG /hpf Final    UA:UC IF INDICATED 07/02/2019 CULTURE NOT INDICATED BY UA RESULT  CNI   Final    Mucus 07/02/2019 1+* NEG /lpf Final    SAMPLES BEING HELD 07/02/2019 1RED,1BL,1SST   Final    COMMENT 07/02/2019 Add-on orders for these samples will be processed based on acceptable specimen integrity and analyte stability, which may vary by analyte. Final    WBC 07/02/2019 7.2  3.6 - 11.0 K/uL Final    RBC 07/02/2019 4.37  3.80 - 5.20 M/uL Final    HGB 07/02/2019 12.8  11.5 - 16.0 g/dL Final    HCT 07/02/2019 37.6  35.0 - 47.0 % Final    MCV 07/02/2019 86.0  80.0 - 99.0 FL Final    MCH 07/02/2019 29.3  26.0 - 34.0 PG Final    MCHC 07/02/2019 34.0  30.0 - 36.5 g/dL Final    RDW 07/02/2019 11.7  11.5 - 14.5 % Final    PLATELET 54/53/6254 485  150 - 400 K/uL Final    MPV 07/02/2019 9.3  8.9 - 12.9 FL Final    NRBC 07/02/2019 0.0  0  WBC Final    ABSOLUTE NRBC 07/02/2019 0.00  0.00 - 0.01 K/uL Final    NEUTROPHILS 07/02/2019 64  32 - 75 % Final    LYMPHOCYTES 07/02/2019 26  12 - 49 % Final    MONOCYTES 07/02/2019 7  5 - 13 % Final    EOSINOPHILS 07/02/2019 3  0 - 7 % Final    BASOPHILS 07/02/2019 0  0 - 1 % Final    IMMATURE GRANULOCYTES 07/02/2019 0  0.0 - 0.5 % Final    ABS. NEUTROPHILS 07/02/2019 4.6  1.8 - 8.0 K/UL Final    ABS. LYMPHOCYTES 07/02/2019 1.8  0.8 - 3.5 K/UL Final    ABS. MONOCYTES 07/02/2019 0.5  0.0 - 1.0 K/UL Final    ABS. EOSINOPHILS 07/02/2019 0.2  0.0 - 0.4 K/UL Final    ABS. BASOPHILS 07/02/2019 0.0  0.0 - 0.1 K/UL Final    ABS. IMM.  GRANS. 07/02/2019 0.0  0.00 - 0.04 K/UL Final    DF 07/02/2019 AUTOMATED    Final    Sodium 07/02/2019 139  136 - 145 mmol/L Final    Potassium 07/02/2019 3.7 3.5 - 5.1 mmol/L Final    Chloride 07/02/2019 107  97 - 108 mmol/L Final    CO2 07/02/2019 25  21 - 32 mmol/L Final    Anion gap 07/02/2019 7  5 - 15 mmol/L Final    Glucose 07/02/2019 92  65 - 100 mg/dL Final    BUN 07/02/2019 10  6 - 20 MG/DL Final    Creatinine 07/02/2019 0.94  0.55 - 1.02 MG/DL Final    BUN/Creatinine ratio 07/02/2019 11* 12 - 20   Final    GFR est AA 07/02/2019 >60  >60 ml/min/1.73m2 Final    GFR est non-AA 07/02/2019 >60  >60 ml/min/1.73m2 Final    Comment: Estimated GFR is calculated using the IDMS-traceable Modification of Diet in Renal Disease (MDRD) Study equation, reported for both  Americans (GFRAA) and non- Americans (GFRNA), and normalized to 1.73m2 body surface area. The physician must decide which value applies to the patient. The MDRD study equation should only be used in individuals age 25 or older. It has not been validated for the following: pregnant women, patients with serious comorbid conditions, or on certain medications, or persons with extremes of body size, muscle mass, or nutritional status.  Calcium 07/02/2019 8.8  8.5 - 10.1 MG/DL Final    Bilirubin, total 07/02/2019 0.3  0.2 - 1.0 MG/DL Final    ALT (SGPT) 07/02/2019 28  12 - 78 U/L Final    AST (SGOT) 07/02/2019 16  15 - 37 U/L Final    Alk. phosphatase 07/02/2019 92  45 - 117 U/L Final    Protein, total 07/02/2019 8.3* 6.4 - 8.2 g/dL Final    Albumin 07/02/2019 4.2  3.5 - 5.0 g/dL Final    Globulin 07/02/2019 4.1* 2.0 - 4.0 g/dL Final    A-G Ratio 07/02/2019 1.0* 1.1 - 2.2   Final    Pregnancy test,urine (POC) 07/02/2019 NEGATIVE   NEG   Final    Bilirubin UA, confirm 07/02/2019 NEGATIVE   NEG   Final       Objective:   Vital Signs: (As obtained by patient/caregiver at home)  There were no vitals taken for this visit.      [INSTRUCTIONS:  \"[x]\" Indicates a positive item  \"[]\" Indicates a negative item  -- DELETE ALL ITEMS NOT EXAMINED]    Constitutional: [x] Appears well-developed and well-nourished [x] No apparent distress      [] Abnormal -     Mental status: [x] Alert and awake  [x] Oriented to person/place/time [x] Able to follow commands    [] Abnormal -     Eyes:   EOM    [x]  Normal    [] Abnormal -   Sclera  [x]  Normal    [] Abnormal -          Discharge [x]  None visible   [] Abnormal -     HENT: [x] Normocephalic, atraumatic  [] Abnormal -   [x] Mouth/Throat: Mucous membranes are moist    External Ears [x] Normal  [] Abnormal -    Neck: [x] No visualized mass [] Abnormal -     Pulmonary/Chest: [x] Respiratory effort normal   [x] No visualized signs of difficulty breathing or respiratory distress        [] Abnormal -      Musculoskeletal:   [x] Normal gait with no signs of ataxia         [x] Normal range of motion of neck        [] Abnormal -     Neurological:        [x] No Facial Asymmetry (Cranial nerve 7 motor function) (limited exam due to video visit)          [x] No gaze palsy        [] Abnormal -          Skin:        [x] No significant exanthematous lesions or discoloration noted on facial skin         [] Abnormal -            Psychiatric:       [x] Normal Affect [] Abnormal -        [x] No Hallucinations    Other pertinent observable physical exam findings:-          Assessment & Plan:   SAMRA Mahajan is a 31 y.o. female who  has a past medical history of Headache. who since middle school, noted headaches, behind the L eye, throbbing, 8/10, occurring 1/ week, lasting 1-2 days, no triggers, Ibuprofen and Tylenol will dull the pain, (+) nausea (-) vomiting (+) photophobia (+) phonophobia (-) visual auras. Patient saw PCP and tried Imitrex with some benefit. Consideration includes migraine, without visual auras, intractable. RECOMMENDATIONS  1. I had a long discussion with patient. Discussed diagnosis, pathophysiology prognosis, available treatment and formulating plan. All questions were answered.   2. Patient to stop by our office to get some samples of Cambia, Zipsor and Zomig nasal spray to try to abort headaches  3. Discussed the need for headache diary and went through the list that can trigger headache (I.e. chocolate)  4. Will consider doing MRI brain and Topamax, if still no improvement despite above    Follow-up and Dispositions    · Return in about 2 months (around 8/17/2020). Wilian Regan is a 31 y.o. female being evaluated by a video visit encounter for concerns as above. A caregiver was present when appropriate. Due to this being a TeleHealth encounter (During [facility] public health emergency), evaluation of the following organ systems was limited: Vitals/Constitutional/EENT/Resp/CV/GI//MS/Neuro/Skin/Heme-Lymph-Imm. Pursuant to the emergency declaration under the [facility], [facility] waiver authority and the [employer] Act, this Virtual  Visit was conducted, with patient's (and/or legal guardian's) consent, to reduce the patient's risk of exposure to COVID-19 and provide necessary medical care. Services were provided through a video synchronous discussion virtually to substitute for in-person clinic visit. Patient and provider were located at their individual homes.         Rebecca Kaminski MD  Diplomate, American Board of Psychiatry and Neurology  Diplomate, Neuromuscular Medicine  Diplomate, American Board of Electrodiagnostic Medicine

## 2020-06-17 NOTE — TELEPHONE ENCOUNTER
----- Message from Jesse EdmondsDolores sent at 6/17/2020 12:22 PM EDT -----  Regarding: dr roberson/ telephone  Patient return call    Caller's first and last name and relationship (if not the patient): pt      Best contact number(s): [phone]      Whose call is being returned: unknown       Details to clarify the request:      Jesse Edmonds7

## 2020-07-07 ENCOUNTER — ED HISTORICAL/CONVERTED ENCOUNTER (OUTPATIENT)
Dept: OTHER | Age: 30
End: 2020-07-07

## 2020-07-21 ENCOUNTER — ED HISTORICAL/CONVERTED ENCOUNTER (OUTPATIENT)
Dept: OTHER | Age: 30
End: 2020-07-21

## 2020-08-03 ENCOUNTER — OP HISTORICAL/CONVERTED ENCOUNTER (OUTPATIENT)
Dept: OTHER | Age: 30
End: 2020-08-03

## 2020-10-25 ENCOUNTER — HOSPITAL ENCOUNTER (EMERGENCY)
Age: 30
Discharge: HOME | End: 2020-10-25
Attending: FAMILY MEDICINE
Payer: COMMERCIAL

## 2020-10-25 VITALS
TEMPERATURE: 97.8 F | DIASTOLIC BLOOD PRESSURE: 81 MMHG | OXYGEN SATURATION: 99 % | RESPIRATION RATE: 18 BRPM | BODY MASS INDEX: 34.39 KG/M2 | WEIGHT: 214 LBS | SYSTOLIC BLOOD PRESSURE: 122 MMHG | HEIGHT: 66 IN | HEART RATE: 112 BPM

## 2020-10-25 DIAGNOSIS — L74.8: Primary | ICD-10-CM

## 2020-10-25 PROCEDURE — 99282 EMERGENCY DEPT VISIT SF MDM: CPT

## 2020-10-25 RX ORDER — SULFAMETHOXAZOLE AND TRIMETHOPRIM 800; 160 MG/1; MG/1
1 TABLET ORAL 2 TIMES DAILY
Qty: 14 TAB | Refills: 0 | Status: SHIPPED | OUTPATIENT
Start: 2020-10-25 | End: 2020-11-01

## 2020-10-25 RX ORDER — AMITRIPTYLINE HYDROCHLORIDE 100 MG/1
100 TABLET, FILM COATED ORAL
COMMUNITY
End: 2021-09-14

## 2020-10-25 RX ORDER — RIZATRIPTAN BENZOATE 10 MG/1
10 TABLET ORAL
COMMUNITY

## 2020-10-26 NOTE — ED TRIAGE NOTES
\" I have a few bumps under both of my arm pits, I get them often but this time they will not go away \"

## 2020-10-26 NOTE — ED PROVIDER NOTES
EMERGENCY DEPARTMENT HISTORY AND PHYSICAL EXAM      Date: 10/25/2020  Patient Name: Taniya Burton    History of Presenting Illness     No chief complaint on file. History Provided By: Patient    HPI: Taniya Burton, 31 y.o. female with a past medical history significant No significant past medical history presents to the ED with cc of bumps underarms. Developed a week ago. Constantly there. Painful. No purulent discharge. Pain is only localized to the underarm. No fever, body aches, chills, bumps anywhere else on the body, pain in the extremities, and all other symptoms are negative. There are no other complaints, changes, or physical findings at this time. PCP: UNKNOWN    No current facility-administered medications on file prior to encounter. Current Outpatient Medications on File Prior to Encounter   Medication Sig Dispense Refill    amitriptyline (ELAVIL) 100 mg tablet Take 100 mg by mouth nightly.  rizatriptan (MAXALT) 10 mg tablet Take 10 mg by mouth once as needed for Migraine. May repeat in 2 hours if needed      Linzess 145 mcg cap capsule       [DISCONTINUED] SUMAtriptan (IMITREX) 25 mg tablet       [DISCONTINUED] Diclofenac Potassium (Cambia) 50 mg pwpk Take 50 mg by mouth as needed (for migraines). 2 Packet 0    [DISCONTINUED] ondansetron (ZOFRAN ODT) 4 mg disintegrating tablet Take 1 Tab by mouth every eight (8) hours as needed for Nausea. 10 Tab 0       Past History     Past Medical History:  Past Medical History:   Diagnosis Date    Headache        Past Surgical History:  History reviewed. No pertinent surgical history. Family History:  History reviewed. No pertinent family history. Social History:  Social History     Tobacco Use    Smoking status: Never Smoker    Smokeless tobacco: Never Used   Substance Use Topics    Alcohol use: No    Drug use: No       Allergies:   Allergies   Allergen Reactions    Latex Itching    Latex, Natural Rubber Itching         Review of Systems     Review of Systems   Constitutional: Negative for chills and fever. HENT: Negative for congestion and sore throat. Respiratory: Negative for cough and shortness of breath. Cardiovascular: Negative for chest pain and palpitations. Gastrointestinal: Negative for nausea and vomiting. Genitourinary: Negative for dysuria and flank pain. Musculoskeletal: Negative for arthralgias and back pain. Skin: Positive for rash (both underrms). Negative for wound. Allergic/Immunologic: Negative for environmental allergies, food allergies and immunocompromised state. Neurological: Negative for light-headedness and headaches. All other systems reviewed and are negative. Physical Exam     Physical Exam  Vitals signs and nursing note reviewed. Constitutional:       Appearance: Normal appearance. Cardiovascular:      Rate and Rhythm: Normal rate and regular rhythm. Pulses: Normal pulses. Heart sounds: Normal heart sounds. Pulmonary:      Effort: Pulmonary effort is normal.      Breath sounds: Normal breath sounds. Musculoskeletal: Normal range of motion. Skin:     General: Skin is warm. Capillary Refill: Capillary refill takes less than 2 seconds. Findings: Lesion present. Comments: Multiple deep abscess cysts on both underarms. Tender to touch. No drainage. Neurological:      General: No focal deficit present. Mental Status: She is alert and oriented to person, place, and time. Psychiatric:         Mood and Affect: Mood normal.         Behavior: Behavior normal.         Diagnostic Study Results     Labs -   No results found for this or any previous visit (from the past 12 hour(s)). Radiologic Studies -   @lastxrresult@  CT Results  (Last 48 hours)    None        CXR Results  (Last 48 hours)    None            Medical Decision Making   I am the first provider for this patient.     I reviewed the vital signs, available nursing notes, past medical history, past surgical history, family history and social history. Vital Signs-Reviewed the patient's vital signs. Patient Vitals for the past 12 hrs:   Temp Pulse Resp BP SpO2   10/25/20 2107 97.8 °F (36.6 °C) (!) 112 18 122/81 99 %       Provider Notes (Medical Decision Making):     MDM  Number of Diagnoses or Management Options  Multiple sweat gland abscess:   Diagnosis management comments: Per H&P, likely suppurative adenitis. Suggest to follow-up with dermatologist for further treatment. Information of local dermatologist provided. Patient is stable with no marked toxicity or distress. All questions were answered and there are no apparent barriers to comprehension or communication. The patient verbalizes agreement with the diagnosis, treatment plan, and understanding of the follow-up instructions. The patient is appropriate for discharge; leaves the Emergency Department walking with a stable gait. Patient understands to return to the ED for any new or worsening symptoms. ED Course:   Initial assessment performed. The patients presenting problems have been discussed, and they are in agreement with the care plan formulated and outlined with them. I have encouraged them to ask questions as they arise throughout their visit. PLAN:  1. Current Discharge Medication List      START taking these medications    Details   trimethoprim-sulfamethoxazole (Bactrim DS) 160-800 mg per tablet Take 1 Tab by mouth two (2) times a day for 7 days. Qty: 14 Tab, Refills: 0           2.    Follow-up Information     Follow up With Specialties Details [facility]  Schedule an appointment as soon as possible for a visit in 3 days  [address] [facility] [address] [facility]  In 3 days  [facility]  [address] Toshia Noonan MD Dermatology In 3 days  [address] [facility] [address] [facility]  [phone]          Return to ED if worse     Diagnosis     Clinical Impression:   1.  Multiple sweat gland abscess

## 2020-11-20 ENCOUNTER — OFFICE VISIT (OUTPATIENT)
Dept: CARDIOLOGY CLINIC | Age: 30
End: 2020-11-20
Payer: COMMERCIAL

## 2020-11-20 VITALS
SYSTOLIC BLOOD PRESSURE: 110 MMHG | HEART RATE: 110 BPM | HEIGHT: 66 IN | DIASTOLIC BLOOD PRESSURE: 80 MMHG | BODY MASS INDEX: 34.87 KG/M2 | WEIGHT: 217 LBS

## 2020-11-20 DIAGNOSIS — R00.0 TACHYCARDIA: Primary | ICD-10-CM

## 2020-11-20 DIAGNOSIS — E66.01 SEVERE OBESITY (HCC): ICD-10-CM

## 2020-11-20 PROCEDURE — 99204 OFFICE O/P NEW MOD 45 MIN: CPT | Performed by: SPECIALIST

## 2020-11-20 PROCEDURE — 93000 ELECTROCARDIOGRAM COMPLETE: CPT | Performed by: SPECIALIST

## 2020-11-20 RX ORDER — ESCITALOPRAM OXALATE 20 MG/1
20 TABLET ORAL DAILY
COMMUNITY
End: 2021-01-08

## 2020-11-20 RX ORDER — CLONAZEPAM 0.5 MG/1
TABLET ORAL
COMMUNITY
End: 2021-01-08

## 2020-11-20 RX ORDER — PRUCALOPRIDE 2 MG/1
TABLET, FILM COATED ORAL
COMMUNITY
End: 2021-01-08

## 2020-11-20 NOTE — PROGRESS NOTES
CARDIOLOGY OFFICE NOTE    James Rodriguez MD, [address], [place], [address] [facility]  Phone [phone]; Fax [phone]  Mobile [phone]   Voice Mail [phone]    LAST OFFICE VISIT : Visit date not found  Imelda Delgado MD       ATTENTION:   This medical record was transcribed using an electronic medical records/speech recognition system. Although proofread, it may and can contain electronic, spelling and other errors. Corrections may be executed at a later time. Please feel free to contact us for any clarifications as needed. ICD-10-CM ICD-9-CM   1. Tachycardia  R00.0 785.0   2. Severe obesity ([address]  E66.01 278.01            Raúl Patel is a 31 y.o. female with  referred for asymptomatic tachycardia    . The patient denies chest pain/ shortness of breath, orthopnea, PND, LE edema, palpitations, syncope, presyncope or fatigue. Cardiac risk factors: family history  I have personally obtained the history from the patient. 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 Is asymptomatic such as during her office visits to see her physicians that she notes that is elevated. She has a family history of heart disease and father had stenting father also has sleep apnea she has never had her cholesterol checked she does not smoke and not diabetic or hypertensive. She is reasonably active at work but does not have an exercise program.  She denies any chest discomfort may be occasional shortness of breath. She also has some leg cramping at times. ACTIVE PROBLEM LIST     Patient Active Problem List    Diagnosis Date Noted    Severe obesity ([address] 11/20/2020           PAST MEDICAL HISTORY     Past Medical History:   Diagnosis Date    Headache            PAST SURGICAL HISTORY     No past surgical history on file.        ALLERGIES     Allergies   Allergen Reactions    Latex Itching    Latex, Natural Rubber Itching          FAMILY HISTORY     No family history on file. negative for cardiac disease       SOCIAL HISTORY     Social History     Socioeconomic History    Marital status:      Spouse name: Not on file    Number of children: Not on file    Years of education: Not on file    Highest education level: Not on file   Tobacco Use    Smoking status: Never Smoker    Smokeless tobacco: Never Used   Substance and Sexual Activity    Alcohol use: No    Drug use: No    Sexual activity: Yes     Partners: Female     Birth control/protection: None   Social History Narrative    ** Merged History Encounter **              MEDICATIONS     Current Outpatient Medications   Medication Sig    prucalopride (Motegrity) 2 mg tab Take  by mouth.  escitalopram oxalate (Lexapro) 20 mg tablet Take 20 mg by mouth daily.  clonazePAM (KlonoPIN) 0.5 mg tablet Take  by mouth nightly as needed for Anxiety.  amitriptyline (ELAVIL) 100 mg tablet Take 100 mg by mouth nightly.  rizatriptan (MAXALT) 10 mg tablet Take 10 mg by mouth once as needed for Migraine. May repeat in 2 hours if needed     No current facility-administered medications for this visit. I have reviewed the nurses notes, vitals, problem list, allergy list, medical history, family, social history and medications. REVIEW OF SYMPTOMS      General: Pt denies excessive weight gain or loss. Pt is able to conduct ADL's  HEENT: Denies blurred vision, headaches, hearing loss, epistaxis and difficulty swallowing. Respiratory: Denies cough, congestion, shortness of breath, NEVAREZ, wheezing or stridor.   Cardiovascular: Denies precordial pain, palpitations, edema or PND  Gastrointestinal: Denies poor appetite, indigestion, abdominal pain or blood in stool  Genitourinary: Denies hematuria, dysuria, increased urinary frequency  Musculoskeletal: Denies joint pain or swelling from muscles or joints  Neurologic: Denies tremor, paresthesias, headache, or sensory motor disturbance  Psychiatric: Denies confusion, insomnia, depression  Integumentray: Denies rash, itching or ulcers. Hematologic: Denies easy bruising, bleeding     PHYSICAL EXAMINATION      Vitals:    11/20/20 0956   BP: 110/80   Pulse: (!) 110   Weight: 217 lb (98.4 kg)   Height: 5' 6\" (1.676 m)     General: Well developed, in no acute distress. HEENT: No jaundice, oral mucosa moist, no oral ulcers  Neck: Supple, no stiffness, no lymphadenopathy, supple  Heart:  Normal S1/S2 negative S3 or S4. Regular, no murmur, gallop or rub, no jugular venous distention  Respiratory: Clear bilaterally x 4, no wheezing or rales  Abdomen:   Soft, non-tender, bowel sounds are active. Extremities:  No edema, normal cap refill, no cyanosis. Musculoskeletal: No clubbing, no deformities  Neuro: A&Ox3, speech clear, gait stable, cooperative, no focal neurologic deficits  Skin: Skin color is normal. No rashes or lesions. Non diaphoretic, moist.  Vascular: 2+ pulses symmetric in all extremities        EKG: NSR with  Non specific ST-T changes     DIAGNOSTIC DATA     No specialty comments available. LABORATORY DATA            Lab Results   Component Value Date/Time    WBC 7.2 07/02/2019 08:20 PM    HGB 12.8 07/02/2019 08:20 PM    HCT 37.6 07/02/2019 08:20 PM    PLATELET 274 62/22/5308 08:20 PM    MCV 86.0 07/02/2019 08:20 PM      Lab Results   Component Value Date/Time    Sodium 139 07/02/2019 08:20 PM    Potassium 3.7 07/02/2019 08:20 PM    Chloride 107 07/02/2019 08:20 PM    CO2 25 07/02/2019 08:20 PM    Anion gap 7 07/02/2019 08:20 PM    Glucose 92 07/02/2019 08:20 PM    BUN 10 07/02/2019 08:20 PM    Creatinine 0.94 07/02/2019 08:20 PM    BUN/Creatinine ratio 11 (L) 07/02/2019 08:20 PM    GFR est AA >60 07/02/2019 08:20 PM    GFR est non-AA >60 07/02/2019 08:20 PM    Calcium 8.8 07/02/2019 08:20 PM    Bilirubin, total 0.3 07/02/2019 08:20 PM    Alk.  phosphatase 92 07/02/2019 08:20 PM    Protein, total 8.3 (H) 07/02/2019 08:20 PM    Albumin 4.2 07/02/2019 08:20 PM    Globulin 4.1 (H) 07/02/2019 08:20 PM    A-G Ratio 1.0 (L) 07/02/2019 08:20 PM    ALT (SGPT) 28 07/02/2019 08:20 PM           ASSESSMENT/RECOMMENDATIONS:.      1.  Tachycardia  - will place a 48-hour Holter monitor on her-Check echocardiogram look for LV dysfunction  -TSH was within normal limits  2. Shortness of breath and leg discomfort   -we will check a D-dimer and if it is positive we will go forward with a ultrasound of her lower extremities  3. Screening cholesterol  -Provide her a lab requisition to have her cholesterol checked  4. Ultimately fatigue will obtain a sleep evaluation. Orders Placed This Encounter    D DIMER     Standing Status:   Future     Standing Expiration Date:   11/20/2021    LIPID PANEL     Standing Status:   Future     Standing Expiration Date:   11/20/2021    HEPATIC FUNCTION PANEL     Standing Status:   Future     Standing Expiration Date:   11/20/2021    AMB POC EKG ROUTINE W/ 12 LEADS, INTER & REP     Order Specific Question:   Reason for Exam:     Answer:   tach    prucalopride (Motegrity) 2 mg tab     Sig: Take  by mouth.  escitalopram oxalate (Lexapro) 20 mg tablet     Sig: Take 20 mg by mouth daily.  clonazePAM (KlonoPIN) 0.5 mg tablet     Sig: Take  by mouth nightly as needed for Anxiety. We discussed the expected course, resolution and complications of the diagnosis(es) in detail. Medication risks, benefits, costs, interactions, and alternatives were discussed as indicated. I advised him to contact the office if his condition worsens, changes or fails to improve as anticipated. He expressed understanding with the diagnosis(es) and plan          Follow-up and Dispositions  ·   Return in about 6 weeks (around 1/1/2021). I have discussed the diagnosis with  [facility] and the intended plan as seen in the above orders.   Questions were answered concerning future plans. I have discussed medication side effects and warnings with the patient as well. Thank you,  Geovanny Carrera MD for involving me in the care of  [facility]. Please do not hesitate to contact me for further questions/concerns. James Palencia MD, [address] [place] [address] Rd., [address]      [facility], [address] [place] [address] Drive      [phone] / [phone] Fax

## 2020-11-20 NOTE — PATIENT INSTRUCTIONS
1) echocardiogram 
 
2) 48 holter monitor 3) screening cholesterol 4) sleep evaluation 5) D Dimer labs at lab [facility]

## 2020-12-04 ENCOUNTER — ANCILLARY PROCEDURE (OUTPATIENT)
Dept: CARDIOLOGY CLINIC | Age: 30
End: 2020-12-04
Payer: COMMERCIAL

## 2020-12-04 ENCOUNTER — CLINICAL SUPPORT (OUTPATIENT)
Dept: CARDIOLOGY CLINIC | Age: 30
End: 2020-12-04
Payer: COMMERCIAL

## 2020-12-04 VITALS
WEIGHT: 217 LBS | DIASTOLIC BLOOD PRESSURE: 68 MMHG | HEIGHT: 66 IN | BODY MASS INDEX: 34.87 KG/M2 | SYSTOLIC BLOOD PRESSURE: 100 MMHG

## 2020-12-04 DIAGNOSIS — R00.0 TACHYCARDIA: Primary | ICD-10-CM

## 2020-12-04 DIAGNOSIS — E66.01 SEVERE OBESITY (HCC): ICD-10-CM

## 2020-12-04 DIAGNOSIS — R00.0 TACHYCARDIA: ICD-10-CM

## 2020-12-04 PROCEDURE — 93225 XTRNL ECG REC<48 HRS REC: CPT | Performed by: SPECIALIST

## 2020-12-04 PROCEDURE — 93227 XTRNL ECG REC<48 HR R&I: CPT | Performed by: SPECIALIST

## 2020-12-04 PROCEDURE — 93306 TTE W/DOPPLER COMPLETE: CPT | Performed by: SPECIALIST

## 2020-12-04 NOTE — PROGRESS NOTES
Applied 48 hr holter per Dr Maria A Richardson dx: donaldo. Pt has #[id number]. Chargeable visit.   Angiodroid

## 2020-12-08 ENCOUNTER — VIRTUAL VISIT (OUTPATIENT)
Dept: SLEEP MEDICINE | Age: 30
End: 2020-12-08
Payer: COMMERCIAL

## 2020-12-08 DIAGNOSIS — G47.33 OSA (OBSTRUCTIVE SLEEP APNEA): Primary | ICD-10-CM

## 2020-12-08 DIAGNOSIS — E66.01 SEVERE OBESITY (HCC): ICD-10-CM

## 2020-12-08 PROCEDURE — 99204 OFFICE O/P NEW MOD 45 MIN: CPT | Performed by: SPECIALIST

## 2020-12-08 NOTE — PATIENT INSTRUCTIONS
Learning About Sleep Apnea  What is it? Sleep apnea means that breathing stops for short periods during sleep. When you stop breathing or have reduced airflow into your lungs during sleep, you don't sleep well and you can be very tired during the day. The oxygen levels in your blood may go down, and carbon dioxide levels go up. It may lead to other problems, such as high blood pressure and heart disease. Sleep apnea can range from mild to severe, based on how often breathing stops during sleep. Breathing may stop as few as 5 times an hour (mild apnea) to 30 or more times an hour (severe apnea). Obstructive sleep apnea is the most common type. This most often occurs because your airways are blocked or partly blocked. Central sleep apnea is less common. It happens when the brain has trouble controlling breathing. Some people have both types. That's called complex sleep apnea. What are the symptoms? There are symptoms of sleep apnea that you may notice and symptoms that others may notice when you're asleep. Symptoms you may notice include:  · Feeling extremely sleepy during the day. · Feeling unrefreshed or tired after a night's sleep. · Problems with memory and concentration, or mood changes. · Morning or night headaches. · Heartburn or a sour taste in your mouth at night. · Swelling of the legs. · Getting up often during the night to urinate. · A dry mouth or sore throat in the morning. Your bed partner may notice that you:  · Have episodes of not breathing. · Snore loudly. Almost all people who have sleep apnea snore. But not all people who snore have sleep apnea. · Toss and turn during sleep. · Have nighttime choking or gasping spells. How is it diagnosed? Your doctor will probably do a physical exam and ask about your past health. He or she may also ask you or your bed partner about your snoring and sleep behavior and how tired you feel during the day.   Your doctor may suggest a sleep study. Sleep studies are a series of tests that look at what happens to the body during sleep. They check for how often you stop breathing or have too little air flowing into your lungs during sleep. They also find out how much oxygen you have in your blood during sleep. A sleep study may take place in your home. Or it might take place at a sleep center, where you will spend the night. How is it treated? Sleep apnea is often treated with devices that deliver air through a mask to help keep your airways open. These include:  · Continuous positive airway pressure (CPAP). This increases air pressure in your throat. It keeps your airway open when you breathe in. It's the most common device. · Bilevel positive airway pressure (BiPAP). This uses different air pressures when you breathe in and out. · Adaptive servo ventilation (ASV). It senses pauses in breathing and adjusts air pressure. It's mostly used for central sleep apnea. If your tonsils or other tissues are blocking your airway, your doctor may suggest surgery to open the airway. How can you care for yourself? You may be able to treat mild sleep apnea by making changes in how you live and the way you sleep. For example, it may help to:  · Lose weight if you are overweight. · Sleep on your side, not your back. · Avoid alcohol and medicines such as sedatives before bed. You may also try an oral breathing device. It helps keep your airways open while you sleep. Where can you learn more? Go to http://www.gray.com/  Enter S121 in the search box to learn more about \"Learning About Sleep Apnea. \"  Current as of: February 24, 2020               Content Version: 12.6  © 9537-0828 [employer], Incorporated. Care instructions adapted under license by [employer] (which disclaims liability or warranty for this information).  If you have questions about a medical condition or this instruction, always ask your healthcare professional. [address] any warranty or liability for your use of this information.

## 2020-12-08 NOTE — PROGRESS NOTES
[address] [facility] [place]. [place], [address]  Tel.  [phone]  Fax. 100 [facility]  [place], [address] [facility]  Tel.  [phone]  Fax. [phone] [address] [facility] [place]   Tel.  [phone]  Fax. [phone]       Emmy Sandhu is a 31 y.o. female who was seen by synchronous (real-time) audio-video technology on 12/8/2020. Consent:  She and/or her healthcare decision maker is aware that this patient-initiated Telehealth encounter is a billable service, with coverage as determined by her insurance carrier. She is aware that she may receive a bill and has provided verbal consent to proceed: Yes    I was in the office while conducting this encounter. Chief Complaint       No chief complaint on file. HPI      Emmy Sandhu is 31 y.o. female seen for evaluation of a sleep disorder. She had seen cardiology for evaluation of resting tachycardia. She had been told of snoring described as prominent. Snoring is not associated with apparent apnea. She normally retires between [employer] AM and awakens with an alarm at 6: 30 AM.  She may awaken several times during the night. She describes herself as tired on awakening and during the day. She may awaken with a gasp or snort. She notes apparent bruxism, leg twitching, getting a bit while still asleep. She may awaken with a gasp or snort. The patient has not undergone diagnostic testing for the current problems. Allergies   Allergen Reactions    Latex Itching    Latex, Natural Rubber Itching       Current Outpatient Medications   Medication Sig Dispense Refill    prucalopride (Motegrity) 2 mg tab Take  by mouth.  escitalopram oxalate (Lexapro) 20 mg tablet Take 20 mg by mouth daily.  clonazePAM (KlonoPIN) 0.5 mg tablet Take  by mouth nightly as needed for Anxiety.       amitriptyline (ELAVIL) 100 mg tablet Take 100 mg by mouth nightly.  rizatriptan (MAXALT) 10 mg tablet Take 10 mg by mouth once as needed for Migraine. May repeat in 2 hours if needed          She  has a past medical history of Headache. She  has no past surgical history on file. She family history is not on file. She  reports that she has never smoked. She has never used smokeless tobacco. She reports that she does not drink alcohol or use drugs. Review of Systems:  Review of Systems   Constitutional: Negative for chills and fever. HENT: Negative for hearing loss and tinnitus. Eyes: Positive for blurred vision. Negative for double vision. Respiratory: Positive for shortness of breath. Negative for cough. Cardiovascular: Positive for chest pain. Negative for palpitations. Gastrointestinal: Positive for abdominal pain and heartburn. Genitourinary: Positive for frequency. Negative for urgency. Musculoskeletal: Positive for back pain and joint pain. Skin: Negative for itching and rash. Neurological: Positive for dizziness and headaches. Psychiatric/Behavioral: Positive for depression. The patient is nervous/anxious and has insomnia. Due to this being a telemedicine evaluation, certain elements of the physical examination are unable to be assessed. Objective:     Weight: 217 lb  BMI: 35.02    General:   Conversant, cooperative   Eyes:   no nystagmus,   Oropharynx:   Mallampati score III,  tongue scalloped                   Skin:  no obvious rashes   Neuro:  Speech fluent, face symmetrical, tongue movement normal   Psych:  Normal affect,  normal countenance        Assessment:       ICD-10-CM ICD-9-CM    1. AL (obstructive sleep apnea)  G47.33 327.23 SLEEP STUDY UNATTENDED, 4 CHANNEL   2. Severe obesity (HCC)  E66.01 278.01        Potential sleep disordered breathing. She will be evaluated with a home sleep test.  Results to be reviewed with her.     Plan:     Orders Placed This Encounter    SLEEP STUDY UNATTENDED, 4 CHANNEL Order Specific Question:   Reason for Exam     Answer:   snoring       * Patient has a history and examination consistent with the diagnosis of sleep apnea. *Home sleep testing was ordered for initial evaluation. * She was provided information on sleep apnea including corresponding risk factors and the importance of proper treatment. * Treatment options if indicated were reviewed today. Instructions:  o The patient would benefit from weight reduction measures. o Do not engage in activities requiring a normal degree of alertness if fatigue is present. o The patient understands that untreated or undertreated sleep apnea could impair judgement and the ability to function normally during the day.  o Call or return if symptoms worsen or persist.          Angelina Butcher MD, [facility]  Electronically signed 12/08/20     Pursuant to the emergency declaration under the [facility], Count includes the [facility] waiver authority and the [employer] Act, this Virtual  Visit was conducted, with patient's consent, to reduce the patient's risk of exposure to COVID-19 and provide continuity of care for an established patient. Services were provided through a video synchronous discussion virtually to substitute for in-person clinic visit. Jessy Pratt MD     This note was created using voice recognition software. Despite editing, there may be syntax errors. This note will not be viewable in [address]     Visit duration: 14 min

## 2020-12-13 LAB
ECHO AO ASC DIAM: 2.7 CM
ECHO AO ROOT DIAM: 3.43 CM
ECHO AV AREA PEAK VELOCITY: 4.04 CM2
ECHO AV AREA VTI: 4.25 CM2
ECHO AV AREA/BSA PEAK VELOCITY: 2 CM2/M2
ECHO AV AREA/BSA VTI: 2.1 CM2/M2
ECHO AV MEAN GRADIENT: 1.45 MMHG
ECHO AV PEAK GRADIENT: 2.57 MMHG
ECHO AV PEAK VELOCITY: 80.22 CM/S
ECHO AV VTI: 15.51 CM
ECHO LA AREA 4C: 12.45 CM2
ECHO LA MAJOR AXIS: 3.13 CM
ECHO LA MINOR AXIS: 1.51 CM
ECHO LA VOL 2C: 39.46 ML (ref 22–52)
ECHO LA VOL 4C: 28.53 ML (ref 22–52)
ECHO LA VOL BP: 35.03 ML (ref 22–52)
ECHO LA VOL/BSA BIPLANE: 16.92 ML/M2 (ref 16–28)
ECHO LA VOLUME INDEX A2C: 19.06 ML/M2 (ref 16–28)
ECHO LA VOLUME INDEX A4C: 13.78 ML/M2 (ref 16–28)
ECHO LV E' LATERAL VELOCITY: 9.98 CM/S
ECHO LV E' SEPTAL VELOCITY: 8.16 CM/S
ECHO LV INTERNAL DIMENSION DIASTOLIC: 4.4 CM (ref 3.9–5.3)
ECHO LV INTERNAL DIMENSION SYSTOLIC: 2.65 CM
ECHO LV IVSD: 0.9 CM (ref 0.6–0.9)
ECHO LV MASS 2D: 125.2 G (ref 67–162)
ECHO LV MASS INDEX 2D: 60.4 G/M2 (ref 43–95)
ECHO LV POSTERIOR WALL DIASTOLIC: 0.87 CM (ref 0.6–0.9)
ECHO LVOT DIAM: 2.4 CM
ECHO LVOT PEAK GRADIENT: 2.05 MMHG
ECHO LVOT PEAK VELOCITY: 71.64 CM/S
ECHO LVOT SV: 66 ML
ECHO LVOT VTI: 14.58 CM
ECHO MV A VELOCITY: 45.64 CM/S
ECHO MV E DECELERATION TIME (DT): 158.49 MS
ECHO MV E VELOCITY: 56.61 CM/S
ECHO MV E/A RATIO: 1.24
ECHO MV E/E' LATERAL: 5.67
ECHO MV E/E' RATIO (AVERAGED): 6.3
ECHO MV E/E' SEPTAL: 6.94
ECHO MV MAX VELOCITY: 61.62 CM/S
ECHO MV MEAN GRADIENT: 0.76 MMHG
ECHO MV PEAK GRADIENT: 1.52 MMHG
ECHO MV PRESSURE HALF TIME (PHT): 45.96 MS
ECHO MV VTI: 12.74 CM
ECHO RA AREA 4C: 9.33 CM2
ECHO RV INTERNAL DIMENSION: 3.71 CM
ECHO RV TAPSE: 1.96 CM (ref 1.5–2)
ECHO TV REGURGITANT MAX VELOCITY: 201.11 CM/S
ECHO TV REGURGITANT PEAK GRADIENT: 16.18 MMHG

## 2020-12-15 ENCOUNTER — HOSPITAL ENCOUNTER (OUTPATIENT)
Dept: SLEEP MEDICINE | Age: 30
Discharge: HOME | End: 2020-12-15
Payer: COMMERCIAL

## 2020-12-15 ENCOUNTER — OFFICE VISIT (OUTPATIENT)
Dept: SLEEP MEDICINE | Age: 30
End: 2020-12-15

## 2020-12-15 DIAGNOSIS — G47.33 OSA (OBSTRUCTIVE SLEEP APNEA): Primary | ICD-10-CM

## 2020-12-15 PROCEDURE — 95806 SLEEP STUDY UNATT&RESP EFFT: CPT | Performed by: SPECIALIST

## 2020-12-15 NOTE — PROGRESS NOTES
[address] [facility] [place]. [place], [address]  Tel.  [phone]  Fax. 100 [facility] 60  [place], [address] [facility]  Tel.  [phone]  Fax. [phone] [address] [facility] [place]   Tel.  [phone]  Fax. [phone]       S>Arianna GonsalvesKaleLuis Eduardoelíasrom is a 31 y.o. female seen today to receive a home sleep testing unit (HST). · Patient was educated on proper hookup and operation of the HST. · Instruction forms and documentation were reviewed and signed. · The patient demonstrated good understanding of the HST.    O>    There were no vitals taken for this visit. A>  1. AL (obstructive sleep apnea)          P>  · General information regarding operations and maintenance of the device was provided. · She was provided information on sleep apnea including coresponding risk factors and the importance of proper treatment. · Follow-up appointment was made to return the HST. She will be contacted once the results have been reviewed. · She was asked to contact our office for any problems regarding her home sleep test study.

## 2020-12-27 ENCOUNTER — TELEPHONE (OUTPATIENT)
Dept: SLEEP MEDICINE | Age: 30
End: 2020-12-27

## 2020-12-27 DIAGNOSIS — G47.33 OSA (OBSTRUCTIVE SLEEP APNEA): Primary | ICD-10-CM

## 2020-12-27 NOTE — TELEPHONE ENCOUNTER
HSAT demonstrated sleep disordered breathing characterized by an overall AHI of 7.7/h associated with minimal SaO2 of 78%. Significant snoring not noted. Pattern of events suggestive of potential REM-related episodes. Recommendation: APAP 6-16 cm. Observed events may improve with weight reduction. Sleep technologist: Please review the HSAT results with the patient. Order has been entered for APAP 6 to 16 cm. Please schedule first compliance appointment.

## 2021-01-07 LAB
ALBUMIN SERPL-MCNC: 4.4 G/DL (ref 3.8–4.8)
ALP SERPL-CCNC: 103 IU/L (ref 39–117)
ALT SERPL-CCNC: 21 IU/L (ref 0–32)
AST SERPL-CCNC: 17 IU/L (ref 0–40)
BILIRUB DIRECT SERPL-MCNC: 0.07 MG/DL (ref 0–0.4)
BILIRUB SERPL-MCNC: <0.2 MG/DL (ref 0–1.2)
CHOLEST SERPL-MCNC: 161 MG/DL (ref 100–199)
D DIMER PPP FEU-MCNC: 1.06 MG/L FEU (ref 0–0.49)
HDLC SERPL-MCNC: 30 MG/DL
LDLC SERPL CALC-MCNC: 81 MG/DL (ref 0–99)
PROT SERPL-MCNC: 7.3 G/DL (ref 6–8.5)
TRIGL SERPL-MCNC: 304 MG/DL (ref 0–149)
VLDLC SERPL CALC-MCNC: 50 MG/DL (ref 5–40)

## 2021-01-07 NOTE — PROGRESS NOTES
Your LDL is at goal and is under 100 but your triglycerides are elevated at 304. I would favor you going on a fenofibrate and 160 mg a day and rechecking her labs in 2 months. Remember to exercise and reduce her carbohydrate intake.     I wish you a happy and healthy new year

## 2021-01-08 ENCOUNTER — OFFICE VISIT (OUTPATIENT)
Dept: CARDIOLOGY CLINIC | Age: 31
End: 2021-01-08
Payer: COMMERCIAL

## 2021-01-08 VITALS
BODY MASS INDEX: 35.03 KG/M2 | SYSTOLIC BLOOD PRESSURE: 110 MMHG | HEIGHT: 66 IN | DIASTOLIC BLOOD PRESSURE: 80 MMHG | WEIGHT: 218 LBS | HEART RATE: 115 BPM

## 2021-01-08 DIAGNOSIS — E78.1 HYPERTRIGLYCERIDEMIA: Primary | ICD-10-CM

## 2021-01-08 DIAGNOSIS — R00.0 TACHYCARDIA: Primary | ICD-10-CM

## 2021-01-08 DIAGNOSIS — E66.01 SEVERE OBESITY (HCC): ICD-10-CM

## 2021-01-08 PROCEDURE — 99214 OFFICE O/P EST MOD 30 MIN: CPT | Performed by: SPECIALIST

## 2021-01-08 RX ORDER — LAMOTRIGINE 50 MG/1
50 TABLET, EXTENDED RELEASE ORAL DAILY
Status: ON HOLD | COMMUNITY
End: 2021-09-09 | Stop reason: SDUPTHER

## 2021-01-08 RX ORDER — METOPROLOL SUCCINATE 25 MG/1
12.5 TABLET, EXTENDED RELEASE ORAL DAILY
Qty: 30 TAB | Refills: 4 | Status: SHIPPED | OUTPATIENT
Start: 2021-01-08 | End: 2021-09-09

## 2021-01-08 NOTE — PROGRESS NOTES
alvaro                     CARDIOLOGY OFFICE NOTE    James Catherine MD, [address], [place], [address] [facility]  Phone [phone]; Fax [phone]  Mobile [phone]   Voice Mail [phone]    LAST OFFICE VISIT : Visit date not found  Roxana Bal MD       ATTENTION:   This medical record was transcribed using an electronic medical records/speech recognition system. Although proofread, it may and can contain electronic, spelling and other errors. Corrections may be executed at a later time. Please feel free to contact us for any clarifications as needed. ICD-10-CM ICD-9-CM   1. Tachycardia  R00.0 785.0   2. Severe obesity ([address]  E66.01 278.01            Valerie Bhatia is a 31 y.o. female with  referred for asymptomatic tachycardia    . The patient denies chest pain/ shortness of breath, orthopnea, PND, LE edema, palpitations, syncope, presyncope or fatigue. Cardiac risk factors: family history  I have personally obtained the history from the patient. HISTORY OF PRESENTING ILLNESS      Ms. James Man returns to the office today for follow-up of all of her cardiac testing. Her D-dimer was slightly elevated but her echo was normal she did have a Holter monitor for 2 days and demonstrated 1.5% S supraventricular ectopic beats and 0 ventricular ectopic beats and her minimum heart rate was 75 with a maximum heart rate of 152. She still experiences fast heart rates at times and with very little activity. She has no shortness of breath. ACTIVE PROBLEM LIST     Patient Active Problem List    Diagnosis Date Noted    Severe obesity ([address] 11/20/2020           PAST MEDICAL HISTORY     Past Medical History:   Diagnosis Date    Headache            PAST SURGICAL HISTORY     No past surgical history on file. ALLERGIES     Allergies   Allergen Reactions    Latex Itching    Latex, Natural Rubber Itching          FAMILY HISTORY     No family history on file. negative for cardiac disease       SOCIAL HISTORY     Social History     Socioeconomic History    Marital status:      Spouse name: Not on file    Number of children: Not on file    Years of education: Not on file    Highest education level: Not on file   Tobacco Use    Smoking status: Never Smoker    Smokeless tobacco: Never Used   Substance and Sexual Activity    Alcohol use: No    Drug use: No    Sexual activity: Yes     Partners: Female     Birth control/protection: None   Social History Narrative    ** Merged History Encounter **              MEDICATIONS     Current Outpatient Medications   Medication Sig    linaCLOtide (Linzess) 290 mcg cap capsule Take  by mouth Daily (before breakfast).  lamoTRIgine (LaMICtal XR) 50 mg tr24 ER tablet Take 50 mg by mouth daily.  amitriptyline (ELAVIL) 100 mg tablet Take 100 mg by mouth nightly.  rizatriptan (MAXALT) 10 mg tablet Take 10 mg by mouth once as needed for Migraine. May repeat in 2 hours if needed     No current facility-administered medications for this visit. I have reviewed the nurses notes, vitals, problem list, allergy list, medical history, family, social history and medications. REVIEW OF SYMPTOMS      General: Pt denies excessive weight gain or loss. Pt is able to conduct ADL's  HEENT: Denies blurred vision, headaches, hearing loss, epistaxis and difficulty swallowing. Respiratory: Denies cough, congestion, shortness of breath, NEVAREZ, wheezing or stridor.   Cardiovascular: Denies precordial pain, palpitations, edema or PND  Gastrointestinal: Denies poor appetite, indigestion, abdominal pain or blood in stool  Genitourinary: Denies hematuria, dysuria, increased urinary frequency  Musculoskeletal: Denies joint pain or swelling from muscles or joints  Neurologic: Denies tremor, paresthesias, headache, or sensory motor disturbance  Psychiatric: Denies confusion, insomnia, depression  Integumentray: Denies rash, itching or ulcers. Hematologic: Denies easy bruising, bleeding     PHYSICAL EXAMINATION      Vitals:    01/08/21 1339   Weight: 218 lb (98.9 kg)   Height: 5' 6\" (1.676 m)     General: Well developed, in no acute distress. HEENT: No jaundice, oral mucosa moist, no oral ulcers  Neck: Supple, no stiffness, no lymphadenopathy, supple  Heart:   Elevated heart rate  Respiratory: Clear bilaterally x 4, no wheezing or rales  Extremities:  No edema, normal cap refill, no cyanosis. Musculoskeletal: No clubbing, no deformities  Neuro: A&Ox3, speech clear, gait stable, cooperative, no focal neurologic deficits  Skin: Skin color is normal. No rashes or lesions. Non diaphoretic, moist.        EKG: NSR with  Non specific ST-T changes     DIAGNOSTIC DATA     1. Echo   12/4/20- EF 55 - 60%, Mitral valve thickening. Myxomatous mitral valve disease. 2. Lipids  , HDL 30, LDL 81,          LABORATORY DATA            Lab Results   Component Value Date/Time    WBC 7.2 07/02/2019 08:20 PM    HGB 12.8 07/02/2019 08:20 PM    HCT 37.6 07/02/2019 08:20 PM    PLATELET 373 05/66/6898 08:20 PM    MCV 86.0 07/02/2019 08:20 PM      Lab Results   Component Value Date/Time    Sodium 139 07/02/2019 08:20 PM    Potassium 3.7 07/02/2019 08:20 PM    Chloride 107 07/02/2019 08:20 PM    CO2 25 07/02/2019 08:20 PM    Anion gap 7 07/02/2019 08:20 PM    Glucose 92 07/02/2019 08:20 PM    BUN 10 07/02/2019 08:20 PM    Creatinine 0.94 07/02/2019 08:20 PM    BUN/Creatinine ratio 11 (L) 07/02/2019 08:20 PM    GFR est AA >60 07/02/2019 08:20 PM    GFR est non-AA >60 07/02/2019 08:20 PM    Calcium 8.8 07/02/2019 08:20 PM    Bilirubin, total <0.2 01/06/2021 12:48 PM    Alk.  phosphatase 103 01/06/2021 12:48 PM    Protein, total 7.3 01/06/2021 12:48 PM    Albumin 4.4 01/06/2021 12:48 PM    Globulin 4.1 (H) 07/02/2019 08:20 PM    A-G Ratio 1.0 (L) 07/02/2019 08:20 PM    ALT (SGPT) 21 01/06/2021 12:48 PM           ASSESSMENT/RECOMMENDATIONS:.      1. Tachycardia  -Her echo demonstrated normal LV function  -She had a few supraventricular ectopic beats on her 48-hour Holter monitor with her minimal rate being 75  -We will give her a small dose of the metoprolol 25 mg at night she will follow her blood pressure closely and should it fall and will hold off on metoprolol  -I will do Dopplers of her lower extremities although her D-dimer was slightly elevated but not significantly. -TSH was within normal limits  2. Shortness of breath and leg discomfort   -Ultrasounding the lower extremities I do not necessarily think she needs a CT of her chest because of suspicion risk of further radiation exposure  3. Screening cholesterol  -  Her cholesterol was at goal    Orders Placed This Encounter    linaCLOtide (Linzess) 290 mcg cap capsule     Sig: Take  by mouth Daily (before breakfast).  lamoTRIgine (LaMICtal XR) 50 mg tr24 ER tablet     Sig: Take 50 mg by mouth daily. We discussed the expected course, resolution and complications of the diagnosis(es) in detail. Medication risks, benefits, costs, interactions, and alternatives were discussed as indicated. I advised him to contact the office if his condition worsens, changes or fails to improve as anticipated. He expressed understanding with the diagnosis(es) and plan          Follow-up and Dispositions  ·   Return in about 6 months (around 7/8/2021). I have discussed the diagnosis with  [facility] and the intended plan as seen in the above orders. Questions were answered concerning future plans. I have discussed medication side effects and warnings with the patient as well. Thank you,  Sharifa Robins MD for involving me in the care of  [facility]. Please do not hesitate to contact me for further questions/concerns. James Marvin MD, [address] [place] [address] [address]      [address] [facility], [address], [facility]      [phone] / [phone] Fax

## 2021-01-11 ENCOUNTER — ANCILLARY PROCEDURE (OUTPATIENT)
Dept: CARDIOLOGY CLINIC | Age: 31
End: 2021-01-11
Payer: COMMERCIAL

## 2021-01-11 VITALS — HEIGHT: 66 IN | BODY MASS INDEX: 34.87 KG/M2 | WEIGHT: 217 LBS

## 2021-01-11 DIAGNOSIS — R00.0 TACHYCARDIA: ICD-10-CM

## 2021-01-11 DIAGNOSIS — E66.01 SEVERE OBESITY (HCC): ICD-10-CM

## 2021-01-11 PROCEDURE — 93970 EXTREMITY STUDY: CPT | Performed by: INTERNAL MEDICINE

## 2021-01-12 NOTE — PROGRESS NOTES
Venous dopplers of your lower extremities are normal with no evidence of blood clots or abnormal blood flow and this is great news.

## 2021-01-14 ENCOUNTER — DOCUMENTATION ONLY (OUTPATIENT)
Dept: SLEEP MEDICINE | Age: 31
End: 2021-01-14

## 2021-01-20 ENCOUNTER — TELEPHONE (OUTPATIENT)
Dept: CARDIOLOGY CLINIC | Age: 31
End: 2021-01-20

## 2021-01-20 NOTE — TELEPHONE ENCOUNTER
Pt states her neurologist(Dr. Charisma Wood) wants to up the dosage for metoprolol; told pt to let Dr. Melvina Barker no.  Please advise      [facility]:[phone]

## 2021-01-22 NOTE — TELEPHONE ENCOUNTER
Pt to take 25 mg daily x 1 week then go up to 50 mg daily for migraines. Asked her to get home BP machine to monitor VS and call neuro if it becomes a problem.

## 2021-02-12 ENCOUNTER — OFFICE VISIT (OUTPATIENT)
Dept: CARDIOLOGY CLINIC | Age: 31
End: 2021-02-12
Payer: COMMERCIAL

## 2021-02-12 VITALS
HEART RATE: 89 BPM | DIASTOLIC BLOOD PRESSURE: 76 MMHG | BODY MASS INDEX: 36 KG/M2 | SYSTOLIC BLOOD PRESSURE: 110 MMHG | OXYGEN SATURATION: 99 % | HEIGHT: 66 IN | WEIGHT: 224 LBS

## 2021-02-12 DIAGNOSIS — R00.0 TACHYCARDIA: Primary | ICD-10-CM

## 2021-02-12 PROCEDURE — 99213 OFFICE O/P EST LOW 20 MIN: CPT | Performed by: SPECIALIST

## 2021-02-12 RX ORDER — RIMEGEPANT SULFATE 75 MG/75MG
75 TABLET, ORALLY DISINTEGRATING ORAL
COMMUNITY

## 2021-02-12 NOTE — PROGRESS NOTES
alvaro                     CARDIOLOGY OFFICE NOTE    James Chu MD, [address], [place], [address] [facility]  Phone [phone]; Fax [phone]  Mobile [phone]   Voice Mail [phone]    LAST OFFICE VISIT : Visit date not found  Cindy Vivas MD       ATTENTION:   This medical record was transcribed using an electronic medical records/speech recognition system. Although proofread, it may and can contain electronic, spelling and other errors. Corrections may be executed at a later time. Please feel free to contact us for any clarifications as needed. ICD-10-CM ICD-9-CM   1. Tachycardia  R00.0 785. 0            Wilian Regan is a 31 y.o. female with  referred for asymptomatic tachycardia    . The patient denies chest pain/ shortness of breath, orthopnea, PND, LE edema, palpitations, syncope, presyncope or fatigue. Cardiac risk factors: family history  I have personally obtained the history from the patient. HISTORY OF PRESENTING ILLNESS      Ms. Thad Montez is doing well with no chest pain or shortness of breath her tachycardia has improved on the beta-blocker. ACTIVE PROBLEM LIST     Patient Active Problem List    Diagnosis Date Noted    Severe obesity ([address] 11/20/2020           PAST MEDICAL HISTORY     Past Medical History:   Diagnosis Date    Headache            PAST SURGICAL HISTORY     No past surgical history on file. ALLERGIES     Allergies   Allergen Reactions    Latex Itching    Latex, Natural Rubber Itching          FAMILY HISTORY     No family history on file.  negative for cardiac disease       SOCIAL HISTORY     Social History     Socioeconomic History    Marital status:      Spouse name: Not on file    Number of children: Not on file    Years of education: Not on file    Highest education level: Not on file   Tobacco Use    Smoking status: Never Smoker    Smokeless tobacco: Never Used   Substance and Sexual Activity  Alcohol use: No    Drug use: No    Sexual activity: Yes     Partners: Female     Birth control/protection: None   Social History Narrative    ** Merged History Encounter **              MEDICATIONS     Current Outpatient Medications   Medication Sig    rimegepant (Nurtec ODT) 75 mg disintegrating tablet Take 75 mg by mouth once as needed for Migraine. AS NEEDED FOR MIGRAINES    linaCLOtide (Linzess) 290 mcg cap capsule Take  by mouth Daily (before breakfast).  lamoTRIgine (LaMICtal XR) 50 mg tr24 ER tablet Take 50 mg by mouth daily.  metoprolol succinate (TOPROL-XL) 25 mg XL tablet Take 0.5 Tabs by mouth daily.  amitriptyline (ELAVIL) 100 mg tablet Take 100 mg by mouth nightly.  rizatriptan (MAXALT) 10 mg tablet Take 10 mg by mouth once as needed for Migraine. May repeat in 2 hours if needed     No current facility-administered medications for this visit. I have reviewed the nurses notes, vitals, problem list, allergy list, medical history, family, social history and medications. REVIEW OF SYMPTOMS   Pertinent positives as per HPI  General: Pt denies excessive weight gain or loss. Pt is able to conduct ADL's  HEENT: Denies blurred vision, headaches, hearing loss, epistaxis and difficulty swallowing. Respiratory: Denies cough, congestion, shortness of breath, NEVAREZ, wheezing or stridor. Cardiovascular: Denies precordial pain, palpitations, edema or PND  Gastrointestinal: Denies poor appetite, indigestion, abdominal pain or blood in stool  Genitourinary: Denies hematuria, dysuria, increased urinary frequency  Musculoskeletal: Denies joint pain or swelling from muscles or joints  Neurologic: Denies tremor, paresthesias, headache, or sensory motor disturbance  Psychiatric: Denies confusion, insomnia, depression  Integumentray: Denies rash, itching or ulcers.   Hematologic: Denies easy bruising, bleeding     PHYSICAL EXAMINATION      Vitals:    02/12/21 1345   BP: 110/76   Pulse: 89 SpO2: 99%   Weight: 224 lb (101.6 kg)   Height: 5' 6\" (1.676 m)     General: Well developed, in no acute distress. HEENT: No jaundice, oral mucosa moist, no oral ulcers  Neck: Supple, no stiffness, no lymphadenopathy, supple  Heart:   Elevated heart rate  Respiratory: Clear bilaterally x 4, no wheezing or rales  Extremities:  No edema, normal cap refill, no cyanosis. Musculoskeletal: No clubbing, no deformities  Neuro: A&Ox3, speech clear, gait stable, cooperative, no focal neurologic deficits  Skin: Skin color is normal. No rashes or lesions. Non diaphoretic, moist.        EKG: NSR with  Non specific ST-T changes     DIAGNOSTIC DATA     1. Echo   12/4/20- EF 55 - 60%, Mitral valve thickening. Myxomatous mitral valve disease. 2. Lipids   1/6/21-, HDL 30, LDL 81,      3. LE Duplex   1/11/21-No evidence of deep vein thrombosis or venous obstruction within the lower extremities bilaterally    4. Holter  48 hour- 12/4/20- 1.5% SVT min HR 75, max 152         LABORATORY DATA            Lab Results   Component Value Date/Time    WBC 7.2 07/02/2019 08:20 PM    HGB 12.8 07/02/2019 08:20 PM    HCT 37.6 07/02/2019 08:20 PM    PLATELET 037 32/08/4204 08:20 PM    MCV 86.0 07/02/2019 08:20 PM      Lab Results   Component Value Date/Time    Sodium 139 07/02/2019 08:20 PM    Potassium 3.7 07/02/2019 08:20 PM    Chloride 107 07/02/2019 08:20 PM    CO2 25 07/02/2019 08:20 PM    Anion gap 7 07/02/2019 08:20 PM    Glucose 92 07/02/2019 08:20 PM    BUN 10 07/02/2019 08:20 PM    Creatinine 0.94 07/02/2019 08:20 PM    BUN/Creatinine ratio 11 (L) 07/02/2019 08:20 PM    GFR est AA >60 07/02/2019 08:20 PM    GFR est non-AA >60 07/02/2019 08:20 PM    Calcium 8.8 07/02/2019 08:20 PM    Bilirubin, total <0.2 01/06/2021 12:48 PM    Alk.  phosphatase 103 01/06/2021 12:48 PM    Protein, total 7.3 01/06/2021 12:48 PM    Albumin 4.4 01/06/2021 12:48 PM    Globulin 4.1 (H) 07/02/2019 08:20 PM    A-G Ratio 1.0 (L) 07/02/2019 08:20 PM    ALT (SGPT) 21 01/06/2021 12:48 PM           ASSESSMENT/RECOMMENDATIONS:.      1.  Tachycardia  -Her echo demonstrated normal LV function  -She had a few supraventricular ectopic beats on her 48-hour Holter monitor with her minimal rate being 75  -She is taking metoprolol what sounds like 12.5 and her heart rate is down and she feels better. She told me her neurologist also said would be reasonable to take her metoprolol as it may benefit her migraines.  -I will do Dopplers of her lower extremities although her D-dimer was slightly elevated but not significantly. -TSH was within normal limits  2. Shortness of breath and leg discomfort   -Ultrasounding the lower extremities I do not necessarily think she needs a CT of her chest because of suspicion risk of further radiation exposure  3. Screening cholesterol  -  Her cholesterol was at goal    Orders Placed This Encounter    rimegepant (Nurtec ODT) 75 mg disintegrating tablet     Sig: Take 75 mg by mouth once as needed for Migraine. AS NEEDED FOR MIGRAINES       We discussed the expected course, resolution and complications of the diagnosis(es) in detail. Medication risks, benefits, costs, interactions, and alternatives were discussed as indicated. I advised him to contact the office if his condition worsens, changes or fails to improve as anticipated. He expressed understanding with the diagnosis(es) and plan          Follow-up and Dispositions  ·   Return in about 6 months (around 8/12/2021). I have discussed the diagnosis with  [facility] and the intended plan as seen in the above orders. Questions were answered concerning future plans. I have discussed medication side effects and warnings with the patient as well. Thank you,  Pedro Cai MD for involving me in the care of  [facility]. Please do not hesitate to contact me for further questions/concerns. [place], [address] [place]      [facility], [address] [place] [address], [facility] [address]      [phone] / [phone] Fax

## 2021-02-12 NOTE — PROGRESS NOTES
Nevin Coto is a 31 y.o. female    Visit Vitals  /76 (BP 1 Location: Left upper arm, BP Patient Position: Sitting, BP Cuff Size: Adult)   Pulse 89   Ht 5' 6\" (1.676 m)   Wt 224 lb (101.6 kg)   SpO2 99%   BMI 36.15 kg/m²       Chief Complaint   Patient presents with    Other     BP CHECK    Irregular Heart Beat     TACHYCARDIA       Chest pain NO  SOB NO  Dizziness NO  Swelling NO  Recent hospital visit NO  Refills NO

## 2021-03-09 ENCOUNTER — VIRTUAL VISIT (OUTPATIENT)
Dept: SLEEP MEDICINE | Age: 31
End: 2021-03-09
Payer: COMMERCIAL

## 2021-03-09 VITALS — HEIGHT: 66 IN | WEIGHT: 220 LBS | BODY MASS INDEX: 35.36 KG/M2

## 2021-03-09 DIAGNOSIS — G47.33 OSA (OBSTRUCTIVE SLEEP APNEA): Primary | ICD-10-CM

## 2021-03-09 PROCEDURE — 99213 OFFICE O/P EST LOW 20 MIN: CPT | Performed by: NURSE PRACTITIONER

## 2021-03-09 RX ORDER — ARIPIPRAZOLE 10 MG/1
10 TABLET ORAL DAILY
Status: ON HOLD | COMMUNITY
End: 2021-09-09 | Stop reason: SDUPTHER

## 2021-03-09 NOTE — PATIENT INSTRUCTIONS
[address] [place]. [address] [facility], [address] Tel.  [phone] Fax. 100 [facility] [address] [place], [address] [facility] Tel.  [phone] Fax. [phone] [address] Tel.  [phone] Fax. [phone] Learning About CPAP for Sleep Apnea What is CPAP? CPAP is a small machine that you use at home every night while you sleep. It increases air pressure in your throat to keep your airway open. When you have sleep apnea, this can help you sleep better so you feel much better. CPAP stands for \"continuous positive airway pressure. \" The CPAP machine will have one of the following: · A mask that covers your nose and mouth · Prongs that fit into your nose · A mask that covers your nose only, the most common type. This type is called NCPAP. The N stands for \"nasal.\" Why is it done? CPAP is usually the best treatment for obstructive sleep apnea. It is the first treatment choice and the most widely used. Your doctor may suggest CPAP if you have: · Moderate to severe sleep apnea. · Sleep apnea and coronary artery disease (CAD) or heart failure. How does it help? · CPAP can help you have more normal sleep, so you feel less sleepy and more alert during the daytime. · CPAP may help keep heart failure or other heart problems from getting worse. · NCPAP may help lower your blood pressure. · If you use CPAP, your bed partner may also sleep better because you are not snoring or restless. What are the side effects? Some people who use CPAP have: · A dry or stuffy nose and a sore throat. · Irritated skin on the face. · Sore eyes. · Bloating. If you have any of these problems, work with your doctor to fix them. Here are some things you can try: · Be sure the mask or nasal prongs fit well. · See if your doctor can adjust the pressure of your CPAP. · If your nose is dry, try a humidifier. · If your nose is runny or stuffy, try decongestant medicine or a steroid nasal spray. If these things do not help, you might try a different type of machine. Some machines have air pressure that adjusts on its own. Others have air pressures that are different when you breathe in than when you breathe out. This may reduce discomfort caused by too much pressure in your nose. Where can you learn more? Go to Zingfin.be Enter O976 in the search box to learn more about \"Learning About CPAP for Sleep Apnea. \"  
© 2985-2922 Healthwise, Incorporated. Care instructions adapted under license by [employer] (which disclaims liability or warranty for this information). This care instruction is for use with your licensed healthcare professional. If you have questions about a medical condition or this instruction, always ask your healthcare professional. [address] any warranty or liability for your use of this information. Content Version: 5.4.10645; Last Revised: January 11, 2010 PROPER SLEEP HYGIENE What to avoid · Do not have drinks with caffeine, such as coffee or black tea, for 8 hours before bed. · Do not smoke or use other types of tobacco near bedtime. Nicotine is a stimulant and can keep you awake. · Avoid drinking alcohol late in the evening, because it can cause you to wake in the middle of the night. · Do not eat a big meal close to bedtime. If you are hungry, eat a light snack. · Do not drink a lot of water close to bedtime, because the need to urinate may wake you up during the night. · Do not read or watch TV in bed. Use the bed only for sleeping and sexual activity. What to try · Go to bed at the same time every night, and wake up at the same time every morning. Do not take naps during the day. · Keep your bedroom quiet, dark, and cool. · Get regular exercise, but not within 3 to 4 hours of your bedtime. Bhaskar Paula · Sleep on a comfortable pillow and mattress. · If watching the clock makes you anxious, turn it facing away from you so you cannot see the time. · If you worry when you lie down, start a worry book. Well before bedtime, write down your worries, and then set the book and your concerns aside. · Try meditation or other relaxation techniques before you go to bed. · If you cannot fall asleep, get up and go to another room until you feel sleepy. Do something relaxing. Repeat your bedtime routine before you go to bed again. · Make your house quiet and calm about an hour before bedtime. Turn down the lights, turn off the TV, log off the computer, and turn down the volume on music. This can help you relax after a busy day. Drowsy Driving: The [place] [address] cites drowsiness as a causing factor in more than 597,722 police reported crashes annually, resulting in 76,000 injuries and 1,500 deaths. Other surveys suggest 55% of people polled have driven while drowsy in the past year, 23% had fallen asleep but not crashed, 3% crashed, and 2% had and accident due to drowsy driving. Who is at risk? Young Drivers: One study of drowsy driving accidents states that 55% of the drivers were under 25 years. Of those, 75% were male. Shift Workers and Travelers: People who work overnight or travel across time zones frequently are at higher risk of experiencing Circadian Rhythm Disorders. They are trying to work and function when their body is programed to sleep. Sleep Deprived: Lack of sleep has a serious impact on your ability to pay attention or focus on a task. Consistently getting less than the average of 8 hours your body needs creates partial or cumulative sleep deprivation. Untreated Sleep Disorders: Sleep Apnea, Narcolepsy, R.L.S., and other sleep disorders (untreated) prevent a person from getting enough restful sleep. This leads to excessive daytime sleepiness and increases the risk for drowsy driving accidents by up to 7 times. Medications / Alcohol: Even over the counter medications can cause drowsiness. Medications that impair a drivers attention should have a warning label. Alcohol naturally makes you sleepy and on its own can cause accidents. Combined with excessive drowsiness its effects are amplified. Signs of Drowsy Driving: * You don't remember driving the last few miles * You may drift out of your william * You are unable to focus and your thoughts wander * You may yawn more often than normal 
 * You have difficulty keeping your eyes open / nodding off * Missing traffic signs, speeding, or tailgating Prevention-  
Good sleep hygiene, lifestyle and behavioral choices have the most impact on drowsy driving. There is no substitute for sleep and the average person requires 8 hours nightly. If you find yourself driving drowsy, stop and sleep. Consider the sleep hygiene tips provided during your visit as well. Medication Refill Policy: Refills for all medications require 1 week advance notice. Please have your pharmacy fax a refill request. We are unable to fax, or call in \"controled substance\" medications and you will need to pick these prescriptions up from our office. [employer] Activation Thank you for requesting access to [employer]. Please follow the instructions below to securely access and download your online medical record. [employer] allows you to send messages to your doctor, view your test results, renew your prescriptions, schedule appointments, and more. How Do I Sign Up? 1. In your internet browser, go to [email or web address]. 2. Click on the First Time User? Click Here link in the Sign In box. You will see the New Member Sign Up page. 3. Enter your [employer] Access Code exactly as it appears below. You will not need to use this code after youve completed the sign-up process. If you do not sign up before the expiration date, you must request a new code. MyChart Access Code: Activation code not generated Current [employer] Status: Active (This is the date your MyChart access code will ) 4. Enter the last four digits of your Social Security Number (xxxx) and Date of Birth (mm/dd/yyyy) as indicated and click Submit. You will be taken to the next sign-up page. 5. Create a [employer] Picturest ID. This will be your [employer] login ID and cannot be changed, so think of one that is secure and easy to remember. 6. Create a [employer] password. You can change your password at any time. 7. Enter your Password Reset Question and Answer. This can be used at a later time if you forget your password. 8. Enter your e-mail address. You will receive e-mail notification when new information is available in [address] 9. Click Sign Up. You can now view and download portions of your medical record. 10. Click the Download Summary menu link to download a portable copy of your medical information. Additional Information If you have questions, please call [phone]. Remember, [employer] is NOT to be used for urgent needs. For medical emergencies, dial 911.

## 2021-03-09 NOTE — PROGRESS NOTES
[address] [facility] [place]. [place], [address]   Tel.  [phone]   Fax. 100 [facility] 60   [place], [address] [facility]   Tel.  [phone]   Fax. [phone] [address]    Tel.  [phone]   Fax. [phone]     Meenakshi Marshall (: 1990) is a 31 y.o. female, established patient, seen for positive airway pressure follow-up and first adherence, she was last seen by Dr. Kye Tobias on 2020, prior notes reviewed in detail. Home sleep test 2020 showed AHI of 7.7/hr with a owest SpO2 of 78%, duration of SpO2 < 88% 2.3 min. ASSESSMENT/PLAN:    ICD-10-CM ICD-9-CM    1. AL (obstructive sleep apnea)  G47.33 327.23 AMB SUPPLY ORDER   2. Adult BMI 35.0-35.9 kg/sq m  Z68.35 V85.35        AHI = 7.7(2020). On ResMed APAP :  6-16 cmH2O. Set up 2021. She is adherent with PAP therapy and PAP continues to benefit patient and remains necessary for control of her sleep apnea. Follow-up and Dispositions    · Return in about 1 year (around 3/9/2022). 1. Sleep Apnea - Continue on current pressures    *  Supplies ordered - full face  mask and heated tubing    Orders Placed This Encounter    AMB SUPPLY ORDER     Diagnosis: (G47.33) AL (obstructive sleep apnea)  (primary encounter diagnosis)     Replacement Supplies for Positive Airway Pressure Therapy Device:   Duration of need: 99 months.  Full Face Mask 1 every 3 months.  Full Face Mask Cushion 1 per month.  Headgear 1 every 6 months.  Pos Airway pressure chin strap     Tubing with heating element 1 every 3 months.  Filter(s) Disposable 2 per month.  Filter(s) Non-Disposable 1 every 6 months. .   [address] [facility] for Humidifier (Replace) 1 every 6 months. ANTOLIN LinaresBC; NPI: [id number]    Electronically signed.  Date:- 21     * Re-enforced proper and regular cleaning for the device. * She was asked to contact our office for any problems regarding PAP therapy. 2. A dedicated weight loss program through appropriate diet and exercise regimen is recommended as significant weight reduction has been shown to reduce severity of obstructive sleep apnea. SUBJECTIVE/OBJECTIVE:    She  is seen today for follow up on PAP device and reports no problems using the device. The following concerns identified:    Drowsiness no Problems exhaling no   Snoring no Forget to put on no   Mask Comfortable yes Can't fall asleep no   Dry Mouth yes Mask falls off no   Air Leaking no Frequent awakenings no     She admits that her sleep has improved on PAP therapy using full face mask and heated tubing. She notes she has started waking up feeling rested and is able to wake up easily. She is having less headaches. We increased humidity and discussed how to adjust tube temp. She is having some air leak but has new mask cushion and will replace. Review of device download indicated:  Auto pressure: 6-16 cmH2O; Max Pressure: 12.8 cmH2O;  95th percentile Pressure: 11.3 cmH2O   95th Percentile Leak: 17.7 L/Min     % Used Days >= 4 hours: 93.  Avg hours used:  7:15. Therapy Apnea Index averaged over PAP use: 0.4 /hr which reflects improved sleep breathing condition. Raymond Sleepiness Score: 8 and Modified F.O.S.Q. Score Total / 2: 15.5 which reflects improved sleep quality over therapy time. Sleep Review of Systems: notable for Negative difficulty falling asleep; Negative awakenings at night; Negative early morning headaches; Negative memory problems; Negative concentration issues;  Negative chest pain; Negative shortness of breath; Negative significant joint pain at night; Negative significant muscle pain at night; Negative rashes or itching; Negative heartburn; Negative significant mood issues; 0 afternoon naps per week    Vitals reported by patient     Visit Vitals  Ht 5' 6\" (1.676 m)   Wt 220 lb (99.8 kg)   BMI 35.51 kg/m²       Physical Exam completed by visual and auditory observation of patient with verbal input from patient. General:   Alert, oriented, not in acute distress   Eyes:  Anicteric Sclerae; no obvious strabismus   Nose:  No obvious nasal septum deviation    Neck:   Midline trachea, no visible mass   Chest/Lungs:  Respiratory effort normal, no visualized signs of difficulty breathing or respiratory distress   CVS:  No JVD   Extremities:  No obvious rashes noted on face, neck, or hands   Neuro:  No facial asymmetry, no focal deficits; no obvious tremor    Psych:  Normal affect,  normal countenance     Sam Lara is being evaluated by a Virtual Visit (video visit) encounter to address concerns as mentioned above. A caregiver was present when appropriate. Due to this being a TeleHealth encounter (During [facility]-69 public health emergency), evaluation of the following organ systems was limited: Vitals/Constitutional/EENT/Resp/CV/GI//MS/Neuro/Skin/Heme-Lymph-Imm. Pursuant to the emergency declaration under the [address] [place] [address] and the [employer] Act, this Virtual Visit was conducted with patient's (and/or legal guardian's) consent, to reduce the patient's risk of exposure to COVID-19 and provide necessary medical care. Patient identification was verified at the start of the visit: YES using name and date of birth. Patient's phone number [phone] (cell)  was confirmed for accuracy. She gives permission for messages regarding results and appointments to be left at that number. Services were provided through a video synchronous discussion virtually to substitute for in-person clinic visit. I was in the office while conducting this encounter, patient located at their home or alternate location of their choice.     An electronic signature was used to authenticate this note.     -- Manual GAVNIO Navarrete, [facility]  03/09/21

## 2021-03-11 ENCOUNTER — DOCUMENTATION ONLY (OUTPATIENT)
Dept: SLEEP MEDICINE | Age: 31
End: 2021-03-11

## 2021-04-05 ENCOUNTER — OFFICE VISIT (OUTPATIENT)
Dept: ENT CLINIC | Age: 31
End: 2021-04-05
Payer: COMMERCIAL

## 2021-04-05 VITALS
HEART RATE: 101 BPM | HEIGHT: 66 IN | SYSTOLIC BLOOD PRESSURE: 120 MMHG | RESPIRATION RATE: 16 BRPM | BODY MASS INDEX: 35.48 KG/M2 | OXYGEN SATURATION: 98 % | WEIGHT: 220.8 LBS | TEMPERATURE: 97.5 F | DIASTOLIC BLOOD PRESSURE: 82 MMHG

## 2021-04-05 DIAGNOSIS — L30.9 DERMATITIS OF EXTERNAL EAR: Primary | ICD-10-CM

## 2021-04-05 DIAGNOSIS — L29.9 EAR ITCH: ICD-10-CM

## 2021-04-05 PROCEDURE — 99203 OFFICE O/P NEW LOW 30 MIN: CPT | Performed by: OTOLARYNGOLOGY

## 2021-04-05 RX ORDER — MOMETASONE FUROATE 1 MG/G
CREAM TOPICAL
Qty: 15 G | Refills: 0 | Status: SHIPPED | OUTPATIENT
Start: 2021-04-05

## 2021-04-05 NOTE — PROGRESS NOTES
Chief Complaint   Patient presents with    New Patient    Itching     Ears bilateral     Visit Vitals  /82 (BP 1 Location: Left upper arm, BP Patient Position: Sitting, BP Cuff Size: Adult)   Pulse (!) 101   Temp 97.5 °F (36.4 °C)   Resp 16   Ht 5' 6\" (1.676 m)   Wt 220 lb 12.8 oz (100.2 kg)   SpO2 98%   BMI 35.64 kg/m²

## 2021-04-05 NOTE — LETTER
4/5/2021 Patient: Renata Zuniga YOB: 1990 Date of Visit: 4/5/2021 Becki Lowe MD 
[place] [address] [facility] [address] [address] [address] Fax: [phone] Dear Becki Lowe MD, Thank you for referring Ms. Arianna Gonzalez to [facility] EAR, NOSE, THROAT AND ALLERGY CARE for evaluation. My notes for this consultation are attached. If you have questions, please do not hesitate to call me. I look forward to following your patient along with you. Sincerely, Phuong Leiva MD

## 2021-04-05 NOTE — PROGRESS NOTES
Otolaryngology-Head and Neck Surgery  New Patient Visit     Patient: Papi Cheung  YOB: 1990  MRN: [id number]  Date of Service: 4/5/2021    Chief Complaint:  Itchy ears     History of Present Illness: Papi Cheung is a 31y.o. year old female who presents today for discussion of itchy ears. Notes bilateral itchy ears, ongoing for the last 1-2 years. Seems to be getting worse; initially every now and again, now occurring daily and nearly constantly. Uses q tips or tries to clear ears with water and that seems to help short term    Denies otalgia, otorrhea    No change in skin/hair products  No headphones, earplugs etc.    No prior ear surgeries    Tendency to dry skin but no skin disorders      Past Medical History:  Past Medical History:   Diagnosis Date    Bipolar 1 disorder ([address]     Headache     IBS (irritable bowel syndrome)     Tachycardia        Past Surgical History:   History reviewed. No pertinent surgical history. Medications:   Current Outpatient Medications   Medication Instructions    amitriptyline (ELAVIL) 100 mg, Oral, EVERY BEDTIME    ARIPiprazole (ABILIFY) 10 mg, Oral, DAILY    lamoTRIgine (LAMICTAL XR) 50 mg, Oral, DAILY    linaCLOtide (Linzess) 290 mcg cap capsule Oral, DAILY BEFORE BREAKFAST    metoprolol succinate (TOPROL-XL) 12.5 mg, Oral, DAILY    Nurtec ODT 75 mg, Oral, ONCE PRN, AS NEEDED FOR MIGRAINES     rizatriptan (MAXALT) 10 mg, Oral, ONCE PRN, May repeat in 2 hours if needed        Allergies: Allergies   Allergen Reactions    Latex Itching    Latex, Natural Rubber Itching       Social History:   Social History     Socioeconomic History    Marital status:    Tobacco Use    Smoking status: Never Smoker    Smokeless tobacco: Never Used   Substance and Sexual Activity    Alcohol use:  Yes    Drug use: No    Sexual activity: Yes     Partners: Female     Birth control/protection: None       Family History:  Family History Problem Relation Age of Onset    Diabetes Father     Heart Disease Father        Review of Systems:    Consitutional: denies fever, excessive weight gain or loss. Eyes: denies diplopia, eye pain. Integumentary: denies new concerning skin lesions. Ears, Nose, Mouth, Throat: denies except as per HPI. Endocrine: denies hot or cold intolerance, increased thirst.  Respiratory: denies cough, hemoptysis, wheezing  Gastrointestinal: denies trouble swallowing, nausea, emesis, regurgitation  Musculoskeletal: denies muscle weakness or wasting  Cardiovascular: denies chest pain, shortness of breath  Neurologic: denies seizures, numbness or tingling, syncope  Hematologic: denies easy bleeding or bruising    Physical Examination:   Vitals:    04/05/21 1438   BP: 120/82   BP 1 Location: Left upper arm   BP Patient Position: Sitting   BP Cuff Size: Adult   Pulse: (!) 101   Resp: 16   Temp: 97.5 °F (36.4 °C)   SpO2: 98%   Weight: 220 lb 12.8 oz (100.2 kg)   Height: 5' 6\" (1.676 m)        General: Comfortable, pleasant, appears stated age  Voice: Strong, speaking in full sentences, no stridor    Face: No masses or lesions, facial strength symmetric   Ears: Dry thickened skin involving EAC meatus bilaterally with flaking of skin. Bilateral ear canal clear dry. Tympanic membrane clear and intact, with visible landmarks. Clear middle ear space  Nose: External nose unremarkable. Dorsum midline. Anterior rhinoscopy demonstrates no lesions. Septum midline. Turbinates without hypertrophy. Oral Cavity / Oropharynx: No trismus. Mucosa pink and moist. No lesions. Tongue is midline and mobile. Palate elevates symmetrically. Uvula midline. Tonsils unremarkable. Base of tongue soft. Floor of mouth soft. Neck: Supple. No adenopathy. Thyroid unremarkable. Palpable laryngeal landmarks. Full neck range of motion   Neurologic: CN II - XI intact. Normal gait      Assessment and Plan:   1.  External ear / ear canal dermatitis  - Avoid ear manipulation, avoid q tips  - Add PRN baby oil, mineral oil  - As skin change seem to be more lateral EAC/meatus, will Rx topical elocon cream. Use sparingly. Counseled on proper use  - Follow up in 4-6 weeks if continues         The patient was instructed to return to clinic if no improvement or progression of symptoms. Signs to watch out for reviewed.       Tari Rai MD   [address] [facility]  [address] [place] [address] 6  [facility]  Office Phone: [phone]

## 2021-09-02 ENCOUNTER — HOSPITAL ENCOUNTER (INPATIENT)
Age: 31
LOS: 7 days | Discharge: HOME | DRG: 881 | End: 2021-09-09
Attending: PSYCHIATRY & NEUROLOGY | Admitting: PSYCHIATRY & NEUROLOGY
Payer: COMMERCIAL

## 2021-09-02 DIAGNOSIS — R45.851 SUICIDAL IDEATIONS: Primary | ICD-10-CM

## 2021-09-02 PROBLEM — F32.9 MAJOR DEPRESSION: Status: ACTIVE | Noted: 2021-09-02

## 2021-09-02 LAB
ALBUMIN SERPL-MCNC: 3.8 G/DL (ref 3.5–5)
ALBUMIN/GLOB SERPL: 1 {RATIO} (ref 1.1–2.2)
ALP SERPL-CCNC: 85 U/L (ref 45–117)
ALT SERPL-CCNC: 41 U/L (ref 12–78)
AMPHET UR QL SCN: NEGATIVE
ANION GAP SERPL CALC-SCNC: 6 MMOL/L (ref 5–15)
APPEARANCE UR: CLEAR
AST SERPL W P-5'-P-CCNC: 19 U/L (ref 15–37)
BACTERIA URNS QL MICRO: NEGATIVE /HPF
BARBITURATES UR QL SCN: NEGATIVE
BASOPHILS # BLD: 0 K/UL (ref 0–0.1)
BASOPHILS NFR BLD: 0 % (ref 0–1)
BENZODIAZ UR QL: NEGATIVE
BILIRUB SERPL-MCNC: 0.3 MG/DL (ref 0.2–1)
BILIRUB UR QL: NEGATIVE
BUN SERPL-MCNC: 5 MG/DL (ref 6–20)
BUN/CREAT SERPL: 6 (ref 12–20)
CA-I BLD-MCNC: 8.8 MG/DL (ref 8.5–10.1)
CANNABINOIDS UR QL SCN: NEGATIVE
CHLORIDE SERPL-SCNC: 109 MMOL/L (ref 97–108)
CO2 SERPL-SCNC: 26 MMOL/L (ref 21–32)
COCAINE UR QL SCN: NEGATIVE
COLOR UR: NORMAL
CREAT SERPL-MCNC: 0.83 MG/DL (ref 0.55–1.02)
DIFFERENTIAL METHOD BLD: ABNORMAL
DRUG SCRN COMMENT,DRGCM: NORMAL
EOSINOPHIL # BLD: 0.1 K/UL (ref 0–0.4)
EOSINOPHIL NFR BLD: 2 % (ref 0–7)
ERYTHROCYTE [DISTWIDTH] IN BLOOD BY AUTOMATED COUNT: 12.2 % (ref 11.5–14.5)
ETHANOL SERPL-MCNC: <4 MG/DL
GLOBULIN SER CALC-MCNC: 3.8 G/DL (ref 2–4)
GLUCOSE SERPL-MCNC: 95 MG/DL (ref 65–100)
GLUCOSE UR STRIP.AUTO-MCNC: NEGATIVE MG/DL
HCG UR QL: NEGATIVE
HCT VFR BLD AUTO: 34.4 % (ref 35–47)
HGB BLD-MCNC: 11.5 G/DL (ref 11.5–16)
HGB UR QL STRIP: NEGATIVE
IMM GRANULOCYTES # BLD AUTO: 0 K/UL (ref 0–0.04)
IMM GRANULOCYTES NFR BLD AUTO: 0 % (ref 0–0.5)
KETONES UR QL STRIP.AUTO: NEGATIVE MG/DL
LEUKOCYTE ESTERASE UR QL STRIP.AUTO: NEGATIVE
LYMPHOCYTES # BLD: 1.3 K/UL (ref 0.8–3.5)
LYMPHOCYTES NFR BLD: 21 % (ref 12–49)
MCH RBC QN AUTO: 30.2 PG (ref 26–34)
MCHC RBC AUTO-ENTMCNC: 33.4 G/DL (ref 30–36.5)
MCV RBC AUTO: 90.3 FL (ref 80–99)
METHADONE UR QL: NEGATIVE
MONOCYTES # BLD: 0.3 K/UL (ref 0–1)
MONOCYTES NFR BLD: 5 % (ref 5–13)
MUCOUS THREADS URNS QL MICRO: NORMAL /LPF
NEUTS SEG # BLD: 4.3 K/UL (ref 1.8–8)
NEUTS SEG NFR BLD: 72 % (ref 32–75)
NITRITE UR QL STRIP.AUTO: NEGATIVE
NRBC # BLD: 0 K/UL (ref 0–0.01)
NRBC BLD-RTO: 0 PER 100 WBC
OPIATES UR QL: NEGATIVE
PCP UR QL: NEGATIVE
PH UR STRIP: 6 [PH] (ref 5–8)
PLATELET # BLD AUTO: 353 K/UL (ref 150–400)
PMV BLD AUTO: 9.3 FL (ref 8.9–12.9)
POTASSIUM SERPL-SCNC: 3.8 MMOL/L (ref 3.5–5.1)
PROT SERPL-MCNC: 7.6 G/DL (ref 6.4–8.2)
PROT UR STRIP-MCNC: NEGATIVE MG/DL
RBC # BLD AUTO: 3.81 M/UL (ref 3.8–5.2)
RBC #/AREA URNS HPF: NORMAL /HPF (ref 0–5)
SARS-COV-2, COV2: NOT DETECTED
SODIUM SERPL-SCNC: 141 MMOL/L (ref 136–145)
SP GR UR REFRACTOMETRY: 1.01 (ref 1–1.03)
SPECIMEN SOURCE: NORMAL
UA: UC IF INDICATED,UAUC: NORMAL
UROBILINOGEN UR QL STRIP.AUTO: 0.1 EU/DL (ref 0.1–1)
WBC # BLD AUTO: 6 K/UL (ref 3.6–11)
WBC URNS QL MICRO: NORMAL /HPF (ref 0–4)

## 2021-09-02 PROCEDURE — 87635 SARS-COV-2 COVID-19 AMP PRB: CPT

## 2021-09-02 PROCEDURE — 80307 DRUG TEST PRSMV CHEM ANLYZR: CPT

## 2021-09-02 PROCEDURE — 80053 COMPREHEN METABOLIC PANEL: CPT

## 2021-09-02 PROCEDURE — 65220000003 HC RM SEMIPRIVATE PSYCH

## 2021-09-02 PROCEDURE — 81025 URINE PREGNANCY TEST: CPT

## 2021-09-02 PROCEDURE — 74011250637 HC RX REV CODE- 250/637: Performed by: PSYCHIATRY & NEUROLOGY

## 2021-09-02 PROCEDURE — 36415 COLL VENOUS BLD VENIPUNCTURE: CPT

## 2021-09-02 PROCEDURE — 81001 URINALYSIS AUTO W/SCOPE: CPT

## 2021-09-02 PROCEDURE — 82077 ASSAY SPEC XCP UR&BREATH IA: CPT

## 2021-09-02 PROCEDURE — 99283 EMERGENCY DEPT VISIT LOW MDM: CPT

## 2021-09-02 PROCEDURE — 85025 COMPLETE CBC W/AUTO DIFF WBC: CPT

## 2021-09-02 RX ORDER — ADHESIVE BANDAGE
30 BANDAGE TOPICAL DAILY PRN
Status: DISCONTINUED | OUTPATIENT
Start: 2021-09-02 | End: 2021-09-09 | Stop reason: HOSPADM

## 2021-09-02 RX ORDER — ARIPIPRAZOLE 5 MG/1
5 TABLET ORAL
Status: DISCONTINUED | OUTPATIENT
Start: 2021-09-02 | End: 2021-09-09 | Stop reason: HOSPADM

## 2021-09-02 RX ORDER — ACETAMINOPHEN 325 MG/1
650 TABLET ORAL
Status: DISCONTINUED | OUTPATIENT
Start: 2021-09-02 | End: 2021-09-09 | Stop reason: HOSPADM

## 2021-09-02 RX ORDER — METOPROLOL SUCCINATE 25 MG/1
25 TABLET, EXTENDED RELEASE ORAL DAILY
Status: DISCONTINUED | OUTPATIENT
Start: 2021-09-03 | End: 2021-09-09 | Stop reason: HOSPADM

## 2021-09-02 RX ORDER — ARIPIPRAZOLE 5 MG/1
5 TABLET ORAL DAILY
COMMUNITY
End: 2021-09-09

## 2021-09-02 RX ORDER — ARIPIPRAZOLE 5 MG/1
5 TABLET ORAL DAILY
Status: DISCONTINUED | OUTPATIENT
Start: 2021-09-03 | End: 2021-09-02

## 2021-09-02 RX ORDER — HYDROXYZINE 50 MG/1
50 TABLET, FILM COATED ORAL
Status: DISCONTINUED | OUTPATIENT
Start: 2021-09-02 | End: 2021-09-09 | Stop reason: HOSPADM

## 2021-09-02 RX ORDER — METOPROLOL SUCCINATE 100 MG/1
100 TABLET, EXTENDED RELEASE ORAL DAILY
COMMUNITY
End: 2021-09-14 | Stop reason: SDUPTHER

## 2021-09-02 RX ORDER — TRAZODONE HYDROCHLORIDE 50 MG/1
50 TABLET ORAL
Status: DISCONTINUED | OUTPATIENT
Start: 2021-09-02 | End: 2021-09-09 | Stop reason: HOSPADM

## 2021-09-02 RX ADMIN — ARIPIPRAZOLE 5 MG: 5 TABLET ORAL at 22:42

## 2021-09-02 NOTE — ED PROVIDER NOTES
EMERGENCY DEPARTMENT HISTORY AND PHYSICAL EXAM      Date: 9/2/2021  Patient Name: Octavia Blackburn    History of Presenting Illness     Chief Complaint   Patient presents with    Suicidal       History Provided By: Patient    HPI: Octavia Blackburn, 27 y.o. female with a past medical history significant tachycardia, hypertension, IBS and past psychiatric history of bipolar 1 disorder presents to the ED with cc of suicidal ideations. She reports becoming overwhelmed recently with resultant plans to overdose on her prescription medications. She has had suicidal ideations in the past and is followed closely by psychiatry and acknowledge that she has a safety plan and her reason for seeking treatment today was part of her safety plan. She is currently on Abilify after stepping down and discontinuation of Elavil and Lamictal recently. She denies any smoking, illicit drug use and only drinks alcohol socially with her last drink being a couple of months ago. Incidentally she ran out of her metoprolol a few weeks ago but has had no symptoms of palpitations or feelings of tachycardia. She specifically denies any recent illnesses, fever, nausea, vomiting, constipation, inappropriate taking of her medications. There are no other complaints, changes, or physical findings at this time. PCP: Hellen Lieberman MD    No current facility-administered medications on file prior to encounter. Current Outpatient Medications on File Prior to Encounter   Medication Sig Dispense Refill    ARIPiprazole (Abilify) 5 mg tablet Take 5 mg by mouth daily.  linaCLOtide (Linzess) 290 mcg cap capsule Take  by mouth Daily (before breakfast).  metoprolol succinate (TOPROL-XL) 100 mg tablet Take 100 mg by mouth daily.  Last dose  Mid june (Patient not taking: Reported on 9/2/2021)      mometasone (ELOCON) 0.1 % topical cream Use sparingly, apply to external ear twice daily x 1 week, then once every few weeks- months PRN itching (Patient not taking: Reported on 9/2/2021) 15 g 0    ARIPiprazole (ABILIFY) 10 mg tablet Take 10 mg by mouth daily. (Patient not taking: Reported on 9/2/2021)      rimegepant (Nurtec ODT) 75 mg disintegrating tablet Take 75 mg by mouth once as needed for Migraine. AS NEEDED FOR MIGRAINES      lamoTRIgine (LaMICtal XR) 50 mg tr24 ER tablet Take 50 mg by mouth daily. (Patient not taking: Reported on 9/2/2021)      metoprolol succinate (TOPROL-XL) 25 mg XL tablet Take 0.5 Tabs by mouth daily. (Patient not taking: Reported on 9/2/2021) 30 Tab 4    amitriptyline (ELAVIL) 100 mg tablet Take 100 mg by mouth nightly. (Patient not taking: Reported on 9/2/2021)      rizatriptan (MAXALT) 10 mg tablet Take 10 mg by mouth once as needed for Migraine. May repeat in 2 hours if needed         Past History     Past Medical History:  Past Medical History:   Diagnosis Date    Bipolar 1 disorder ([address]     Headache     IBS (irritable bowel syndrome)     Tachycardia        Past Surgical History:  History reviewed. No pertinent surgical history. Family History:  Family History   Problem Relation Age of Onset    Diabetes Father     Heart Disease Father        Social History:  Social History     Tobacco Use    Smoking status: Never Smoker    Smokeless tobacco: Never Used   Vaping Use    Vaping Use: Never used   Substance Use Topics    Alcohol use: Yes    Drug use: No       Allergies: Allergies   Allergen Reactions    Latex Itching    Latex, Natural Rubber Itching         Review of Systems     Review of Systems   Constitutional: Negative for chills, fatigue and fever. HENT: Negative for ear pain, postnasal drip, rhinorrhea, sinus pressure, sore throat and trouble swallowing. Eyes: Negative for discharge and visual disturbance. Respiratory: Negative for cough, chest tightness, shortness of breath and wheezing. Cardiovascular: Negative for chest pain, palpitations and leg swelling. Gastrointestinal: Negative for abdominal distention, abdominal pain, blood in stool, constipation, diarrhea, nausea and vomiting. Endocrine: Negative. Genitourinary: Negative for dysuria, frequency and urgency. Musculoskeletal: Negative for back pain, myalgias, neck pain and neck stiffness. Skin: Negative for rash. Allergic/Immunologic: Negative. Neurological: Negative for dizziness, syncope, weakness and headaches. Hematological: Does not bruise/bleed easily. Psychiatric/Behavioral: Positive for suicidal ideas. Negative for confusion, hallucinations and sleep disturbance. The patient is nervous/anxious. All other systems reviewed and are negative. Physical Exam     Physical Exam  Vitals and nursing note reviewed. Constitutional:       Appearance: She is well-developed and normal weight. HENT:      Head: Normocephalic and atraumatic. Right Ear: External ear normal.      Left Ear: External ear normal.      Nose: Nose normal.      Mouth/Throat:      Pharynx: Oropharynx is clear. Eyes:      Extraocular Movements: Extraocular movements intact. Pupils: Pupils are equal, round, and reactive to light. Neck:      Vascular: No JVD. Trachea: No tracheal deviation. Cardiovascular:      Rate and Rhythm: Normal rate and regular rhythm. Pulses: Normal pulses. Radial pulses are 2+ on the right side and 2+ on the left side. Heart sounds: Normal heart sounds. No murmur heard. Pulmonary:      Effort: Pulmonary effort is normal. No respiratory distress. Breath sounds: Normal breath sounds. Chest:      Chest wall: No tenderness. Abdominal:      General: Bowel sounds are normal.      Palpations: Abdomen is soft. Tenderness: There is no abdominal tenderness. Musculoskeletal:         General: Normal range of motion. Cervical back: Normal range of motion and neck supple. Right lower leg: No tenderness. No edema.       Left lower leg: No tenderness. No edema. Skin:     General: Skin is warm and dry. Capillary Refill: Capillary refill takes less than 2 seconds. Neurological:      General: No focal deficit present. Mental Status: She is alert and oriented to person, place, and time. Psychiatric:         Attention and Perception: Attention normal. She does not perceive auditory or visual hallucinations. Mood and Affect: Affect is blunt. Speech: Speech normal.         Behavior: Behavior normal. Behavior is cooperative. Thought Content: Thought content includes suicidal ideation. Thought content includes suicidal plan. Cognition and Memory: Cognition normal.         Judgment: Judgment normal.         Lab and Diagnostic Study Results     Labs -     Recent Results (from the past 12 hour(s))   COVID-19 RAPID TEST    Collection Time: 09/02/21  1:45 PM   Result Value Ref Range    Specimen source Nasopharyngeal     SARS-COV-2    Collection Time: 09/02/21  1:45 PM   Result Value Ref Range    SARS-CoV-2 Not Detected Not Detected     HCG URINE, QL    Collection Time: 09/02/21  1:58 PM   Result Value Ref Range    HCG urine, QL Negative Negative     DRUG SCREEN, URINE    Collection Time: 09/02/21  1:58 PM   Result Value Ref Range    AMPHETAMINES Negative Negative      BARBITURATES Negative Negative      BENZODIAZEPINES Negative Negative      COCAINE Negative Negative      METHADONE Negative Negative      OPIATES Negative Negative      PCP(PHENCYCLIDINE) Negative Negative      THC (TH-CANNABINOL) Negative Negative      Drug screen comment        This test is a screen for drugs of abuse in a medical setting only (i.e., they are unconfirmed results and as such must not be used for non-medical purposes, e.g.,employment testing, legal testing). Due to its inherent nature, false positive (FP) and false negative (FN) results may be obtained.  Therefore, if necessary for medical care, recommend confirmation of positive findings by GC/MS. URINALYSIS W/ REFLEX CULTURE    Collection Time: 09/02/21  1:58 PM    Specimen: Urine   Result Value Ref Range    Color Yellow/Straw      Appearance Clear Clear      Specific gravity 1.010 1.003 - 1.030      pH (UA) 6.0 5.0 - 8.0      Protein Negative Negative mg/dL    Glucose Negative Negative mg/dL    Ketone Negative Negative mg/dL    Bilirubin Negative Negative      Blood Negative Negative      Urobilinogen 0.1 0.1 - 1.0 EU/dL    Nitrites Negative Negative      Leukocyte Esterase Negative Negative      UA:UC IF INDICATED Culture not indicated by UA result Culture not indicated by UA result      WBC 0-4 0 - 4 /hpf    RBC 0-5 0 - 5 /hpf    Bacteria Negative Negative /hpf    Mucus Trace /lpf   CBC WITH AUTOMATED DIFF    Collection Time: 09/02/21  2:30 PM   Result Value Ref Range    WBC 6.0 3.6 - 11.0 K/uL    RBC 3.81 3.80 - 5.20 M/uL    HGB 11.5 11.5 - 16.0 g/dL    HCT 34.4 (L) 35.0 - 47.0 %    MCV 90.3 80.0 - 99.0 FL    MCH 30.2 26.0 - 34.0 PG    MCHC 33.4 30.0 - 36.5 g/dL    RDW 12.2 11.5 - 14.5 %    PLATELET 990 058 - 813 K/uL    MPV 9.3 8.9 - 12.9 FL    NRBC 0.0 0.0  WBC    ABSOLUTE NRBC 0.00 0.00 - 0.01 K/uL    NEUTROPHILS 72 32 - 75 %    LYMPHOCYTES 21 12 - 49 %    MONOCYTES 5 5 - 13 %    EOSINOPHILS 2 0 - 7 %    BASOPHILS 0 0 - 1 %    IMMATURE GRANULOCYTES 0 0 - 0.5 %    ABS. NEUTROPHILS 4.3 1.8 - 8.0 K/UL    ABS. LYMPHOCYTES 1.3 0.8 - 3.5 K/UL    ABS. MONOCYTES 0.3 0.0 - 1.0 K/UL    ABS. EOSINOPHILS 0.1 0.0 - 0.4 K/UL    ABS. BASOPHILS 0.0 0.0 - 0.1 K/UL    ABS. IMM.  GRANS. 0.0 0.00 - 0.04 K/UL    DF AUTOMATED     METABOLIC PANEL, COMPREHENSIVE    Collection Time: 09/02/21  2:30 PM   Result Value Ref Range    Sodium 141 136 - 145 mmol/L    Potassium 3.8 3.5 - 5.1 mmol/L    Chloride 109 (H) 97 - 108 mmol/L    CO2 26 21 - 32 mmol/L    Anion gap 6 5 - 15 mmol/L    Glucose 95 65 - 100 mg/dL    BUN 5 (L) 6 - 20 mg/dL    Creatinine 0.83 0.55 - 1.02 mg/dL    BUN/Creatinine ratio 6 (L) 12 - 20      GFR est AA >60 >60 ml/min/1.73m2    GFR est non-AA >60 >60 ml/min/1.73m2    Calcium 8.8 8.5 - 10.1 mg/dL    Bilirubin, total 0.3 0.2 - 1.0 mg/dL    AST (SGOT) 19 15 - 37 U/L    ALT (SGPT) 41 12 - 78 U/L    Alk. phosphatase 85 45 - 117 U/L    Protein, total 7.6 6.4 - 8.2 g/dL    Albumin 3.8 3.5 - 5.0 g/dL    Globulin 3.8 2.0 - 4.0 g/dL    A-G Ratio 1.0 (L) 1.1 - 2.2     ETHYL ALCOHOL    Collection Time: 09/02/21  2:30 PM   Result Value Ref Range    ALCOHOL(ETHYL),SERUM <4 <10 mg/dL       Radiologic Studies -   @lastxrresult@  CT Results  (Last 48 hours)    None        CXR Results  (Last 48 hours)    None            Medical Decision Making   - I am the first provider for this patient. - I reviewed the vital signs, available nursing notes, past medical history, past surgical history, family history and social history. - Initial assessment performed. The patients presenting problems have been discussed, and they are in agreement with the care plan formulated and outlined with them. I have encouraged them to ask questions as they arise throughout their visit. Vital Signs-Reviewed the patient's vital signs. Patient Vitals for the past 12 hrs:   Temp Pulse Resp BP SpO2   09/02/21 2025 98.7 °F (37.1 °C) 91 18 107/77    09/02/21 1803 98.5 °F (36.9 °C) 88  131/87    09/02/21 1338 98.4 °F (36.9 °C) 92 16 113/86 100 %       Records Reviewed: Nursing Notes    The patient presents with suicidal ideations with a differential diagnosis of suicidal behavior, depression, bipolar disorder, mood disorder      ED Course:     ED Course as of Sep 02 2250   Thu Sep 02, 2021   1534 Reviewed labs. Patient medically stable. Awaiting behavioral health evaluation. [KR]      ED Course User Index  [KR] Deborrah Lucas NP       Provider Notes (Medical Decision Making):     MDM  Number of Diagnoses or Management Options  Suicidal ideations  Diagnosis management comments:  The patient will be worked up with labs, urinalysis, drug/tox screen, and ECG for medical clearance. Amount and/or Complexity of Data Reviewed  Clinical lab tests: reviewed and ordered  Discuss the patient with other providers: yes    Patient Progress  Patient progress: stable             Disposition   Disposition: Admitted to behavioral health    Admitted      Diagnosis     Clinical Impression:   1. Suicidal ideations        Attestations:  10:51 PM  I was personally available for consultation in the emergency department. I have reviewed the chart and agree with the documentation recorded by the STEFANO, including the assessment, treatment plan, and disposition. Cole Costello, DO Cole Costello, DO    Please note that this dictation was completed with [employer], the computer voice recognition software. Quite often unanticipated grammatical, syntax, homophones, and other interpretive errors are inadvertently transcribed by the computer software. Please disregard these errors. Please excuse any errors that have escaped final proofreading. Thank you.

## 2021-09-02 NOTE — ED TRIAGE NOTES
Pt er with c/o \"depression and not feeling safe with herself\" .  Denies drug use, previous attempt in feb

## 2021-09-02 NOTE — ED NOTES
MadisynRN(name)NANCY Harrell with (name) have completed a preliminary search of belonging in the presence of [employer]. Items that have been sent to security for safety reasons:  none(items)  Personal belongings list:  Please Document in Narrator Under  Valuables    Patient placed in a green gown, personal belongings placed in belongings bag, patient label placed on the outside of the bag, and placed in a bin at nurse's station 3(station #). Psych safe room setup completed. Plan of care communicated with [employer].

## 2021-09-02 NOTE — PROGRESS NOTES
Pt care plan r/t SI, pt contracted to the Rn for safety and agreed to seek out staff or family prior to acting upon SI. Pt commended for recognizing and proactively seeking out help without engaging in any self harm.
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
## 2021-09-02 NOTE — BH NOTES
Taniya Oliva presented to the hospital today and was admitted from the ER with Dr. Cristian Bah as her attending with a dx of depression and SI. She told this nurse that she has a combination of issues with grief, as her grandmother recently passed away, and she is overwhelmed at home with caring for her 15year old son. As she was feeling overwhelmed and was having thoughts of suicide and did not trust herself not to act upon it, she asked her wife of 6 years to bring her to the hospital. She is employed full time at [employer]. She states she does have a good support system of family and friends. She has a history of seeing a therapist at [facility] and states she has a diagnosis of bipolar for which she stated she takes 5mg of Abilify daily. She denies AH or VH , she states she has an anxiety level of 9 and feelings of sadness or depression she also rates at a 9. She states medically she has a history of hypertension for which she took 100mg of metoprolol but states she could not get a refill and hasn't taken it for at least 2 weeks. Her skin is dry and intact. Her legal issues r/t a misdemeanor shoplifting charge which she is doing restitution and classes for which, when complete, her charge will be dropped. Her belongings were inventoried, she read and signed all of her documentation, she was given a tour of the unit. She contracted to this Rn that she would contact a staff or family if she started having feeling so SI, prior to acting upon it.

## 2021-09-03 PROCEDURE — 74011250637 HC RX REV CODE- 250/637: Performed by: PSYCHIATRY & NEUROLOGY

## 2021-09-03 PROCEDURE — 65220000003 HC RM SEMIPRIVATE PSYCH

## 2021-09-03 RX ADMIN — LINACLOTIDE 290 MCG: 290 CAPSULE, GELATIN COATED ORAL at 08:10

## 2021-09-03 RX ADMIN — ARIPIPRAZOLE 5 MG: 5 TABLET ORAL at 21:33
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
## 2021-09-03 NOTE — GROUP NOTE
IP  GROUP DOCUMENTATION INDIVIDUAL                                                                          Group Therapy Note    Date: 9/3/2021    Group Start Time: 0935  Group End Time: 1020  Group Topic: Education Group - Inpatient    [facility] 2  NON ACUTE    Starla Pulido    IP [address] [facility] GROUP DOCUMENTATION GROUP    Group Therapy Note    Facilitated discussion focused on identifying steps needed to improve in one's well-being and identifying goals that would help to ensure follow-up    Attendees: 8/11         Attendance: Attended    Patient's Goal:  Attend group daily     Interventions/techniques: Informed and Supported    Follows Directions: Followed directions    Interactions: Interacted appropriately    Mental Status: Calm    Behavior/appearance: Cooperative    Goals Achieved: Able to engage in interactions, Able to listen to others, Able to self-disclose and Discussed coping      Additional Notes:  Receptive to information discussed and engaged. Pt shared prior to admission on a scale of 0/poor-10/good she was taking care of herself at a \"4\" and shared Neelima Morton is planned for the day or what she can get out of doing that day\"  influences how well she takes care of herselff.  Pt shared her goal is to [employer] on developing coping skills for depression and work on controling her anxiety\" Pt shared \"not changing her mindset \" would interfere with completing her  goals    Ameya Perrin

## 2021-09-03 NOTE — CONSULTS
Consult    Subjective:     Patient is a 27y.o. year old female) history of bipolar disorder hypertension tachycardia irritable bowel syndrome admitted to psychiatric floor because of depression suicidal ideation      Patient denies any chest pain or shortness of breath nausea vomiting diarrhea constipation    Past Medical History:   Diagnosis Date    Bipolar 1 disorder ([address]     Headache     IBS (irritable bowel syndrome)     Tachycardia       History reviewed. No pertinent surgical history. Family History   Problem Relation Age of Onset    Diabetes Father     Heart Disease Father       Social History     Tobacco Use    Smoking status: Never Smoker    Smokeless tobacco: Never Used   Substance Use Topics    Alcohol use: Yes       Current Facility-Administered Medications   Medication Dose Route Frequency Provider Last Rate Last Admin    hydrOXYzine HCL (ATARAX) tablet 50 mg  50 mg Oral TID PRN Deanna Dorman MD        traZODone (DESYREL) tablet 50 mg  50 mg Oral QHS PRN Deanna Dorman MD        acetaminophen (TYLENOL) tablet 650 mg  650 mg Oral Q4H PRN Deanna Dorman MD        magnesium hydroxide (MILK OF MAGNESIA) 400 mg/5 mL oral suspension 30 mL  30 mL Oral DAILY PRN MD Reshma Lema [START ON 9/3/2021] linaCLOtide (LINZESS) capsule 290 mcg  290 mcg Oral ACB Carlos Rodriguez MD Hardin [START ON 9/3/2021] ARIPiprazole (ABILIFY) tablet 5 mg  5 mg Oral DAILY Kami Rodriguez MD            Allergies   Allergen Reactions    Latex Itching    Latex, Natural Rubber Itching        Review of Systems:  Constitutional: Negative for chills and fever. HENT: Negative. Eyes: Negative. Respiratory: Negative. Cardiovascular: Negative. Gastrointestinal: Negative for abdominal pain and nausea. Skin: Negative. Neurological: Negative. Objective: Intake and Output:    No intake/output data recorded. No intake/output data recorded.     Physical Exam:   Constitutional: pt is oriented to person, place, and time.   HENT:   Head: Normocephalic and atraumatic. Eyes: Pupils are equal, round, and reactive to light. EOM are normal.   Cardiovascular: Normal rate, regular rhythm and normal heart sounds. Pulmonary/Chest: Breath sounds normal. No wheezes. No rales. Exhibits no tenderness. Abdominal: Soft. Bowel sounds are normal. There is no abdominal tenderness. There is no rebound and no guarding. Musculoskeletal: Normal range of motion. Neurological: pt is alert and oriented to person, place, and time. Alert. Normal strength. No cranial nerve deficit or sensory deficit. Displays a negative Romberg sign. Data Review:   Recent Results (from the past 24 hour(s))   COVID-19 RAPID TEST    Collection Time: 09/02/21  1:45 PM   Result Value Ref Range    Specimen source Nasopharyngeal     SARS-COV-2    Collection Time: 09/02/21  1:45 PM   Result Value Ref Range    SARS-CoV-2 Not Detected Not Detected     HCG URINE, QL    Collection Time: 09/02/21  1:58 PM   Result Value Ref Range    HCG urine, QL Negative Negative     DRUG SCREEN, URINE    Collection Time: 09/02/21  1:58 PM   Result Value Ref Range    AMPHETAMINES Negative Negative      BARBITURATES Negative Negative      BENZODIAZEPINES Negative Negative      COCAINE Negative Negative      METHADONE Negative Negative      OPIATES Negative Negative      PCP(PHENCYCLIDINE) Negative Negative      THC (TH-CANNABINOL) Negative Negative      Drug screen comment        This test is a screen for drugs of abuse in a medical setting only (i.e., they are unconfirmed results and as such must not be used for non-medical purposes, e.g.,employment testing, legal testing). Due to its inherent nature, false positive (FP) and false negative (FN) results may be obtained. Therefore, if necessary for medical care, recommend confirmation of positive findings by GC/MS.    URINALYSIS W/ REFLEX CULTURE    Collection Time: 09/02/21  1:58 PM    Specimen: Urine   Result Value Ref Range    Color Yellow/Straw      Appearance Clear Clear      Specific gravity 1.010 1.003 - 1.030      pH (UA) 6.0 5.0 - 8.0      Protein Negative Negative mg/dL    Glucose Negative Negative mg/dL    Ketone Negative Negative mg/dL    Bilirubin Negative Negative      Blood Negative Negative      Urobilinogen 0.1 0.1 - 1.0 EU/dL    Nitrites Negative Negative      Leukocyte Esterase Negative Negative      UA:UC IF INDICATED Culture not indicated by UA result Culture not indicated by UA result      WBC 0-4 0 - 4 /hpf    RBC 0-5 0 - 5 /hpf    Bacteria Negative Negative /hpf    Mucus Trace /lpf   CBC WITH AUTOMATED DIFF    Collection Time: 09/02/21  2:30 PM   Result Value Ref Range    WBC 6.0 3.6 - 11.0 K/uL    RBC 3.81 3.80 - 5.20 M/uL    HGB 11.5 11.5 - 16.0 g/dL    HCT 34.4 (L) 35.0 - 47.0 %    MCV 90.3 80.0 - 99.0 FL    MCH 30.2 26.0 - 34.0 PG    MCHC 33.4 30.0 - 36.5 g/dL    RDW 12.2 11.5 - 14.5 %    PLATELET 005 338 - 004 K/uL    MPV 9.3 8.9 - 12.9 FL    NRBC 0.0 0.0  WBC    ABSOLUTE NRBC 0.00 0.00 - 0.01 K/uL    NEUTROPHILS 72 32 - 75 %    LYMPHOCYTES 21 12 - 49 %    MONOCYTES 5 5 - 13 %    EOSINOPHILS 2 0 - 7 %    BASOPHILS 0 0 - 1 %    IMMATURE GRANULOCYTES 0 0 - 0.5 %    ABS. NEUTROPHILS 4.3 1.8 - 8.0 K/UL    ABS. LYMPHOCYTES 1.3 0.8 - 3.5 K/UL    ABS. MONOCYTES 0.3 0.0 - 1.0 K/UL    ABS. EOSINOPHILS 0.1 0.0 - 0.4 K/UL    ABS. BASOPHILS 0.0 0.0 - 0.1 K/UL    ABS. IMM.  GRANS. 0.0 0.00 - 0.04 K/UL    DF AUTOMATED     METABOLIC PANEL, COMPREHENSIVE    Collection Time: 09/02/21  2:30 PM   Result Value Ref Range    Sodium 141 136 - 145 mmol/L    Potassium 3.8 3.5 - 5.1 mmol/L    Chloride 109 (H) 97 - 108 mmol/L    CO2 26 21 - 32 mmol/L    Anion gap 6 5 - 15 mmol/L    Glucose 95 65 - 100 mg/dL    BUN 5 (L) 6 - 20 mg/dL    Creatinine 0.83 0.55 - 1.02 mg/dL    BUN/Creatinine ratio 6 (L) 12 - 20      GFR est AA >60 >60 ml/min/1.73m2    GFR est non-AA >60 >60 ml/min/1.73m2    Calcium 8.8 8.5 - 10.1 mg/dL    Bilirubin, total 0.3 0.2 - 1.0 mg/dL    AST (SGOT) 19 15 - 37 U/L    ALT (SGPT) 41 12 - 78 U/L    Alk.  phosphatase 85 45 - 117 U/L    Protein, total 7.6 6.4 - 8.2 g/dL    Albumin 3.8 3.5 - 5.0 g/dL    Globulin 3.8 2.0 - 4.0 g/dL    A-G Ratio 1.0 (L) 1.1 - 2.2     ETHYL ALCOHOL    Collection Time: 09/02/21  2:30 PM   Result Value Ref Range    ALCOHOL(ETHYL),SERUM <4 <10 mg/dL       No orders to display        Assessment:     Active Problems:    Major depression (9/2/2021)      Suicidal ideations (9/2/2021)    Depression suicidal ideation  Bipolar disorder  Hypertension tachycardia      Plan:   Continue current medication as recommended by the psychiatrist    Thank you for call me for consultation

## 2021-09-03 NOTE — BH NOTES
PSA PART II ADDITIONAL INFORMATION        Access To [employer]: No    Substance Use: NO    Last Use: N/A    Type of Substance: no substance use    Frequency of Use: N/A    Request to See : NO    If yes, notified : NO    Release of Information Signed: YES    Release of Information Signed For: Her wife Marielos Wilkerson ([phone]).

## 2021-09-03 NOTE — GROUP NOTE
IP  GROUP DOCUMENTATION INDIVIDUAL                                                                          Group Therapy Note    Date: 9/3/2021    Group Start Time: 9225  Group End Time: 1400  Group Topic: Recreational/Music Therapy    SRM 2  NON ACUTE    Rhea Dark    IP  GROUP DOCUMENTATION GROUP    Group Therapy Note    Facilitated group exercise program as positive leisure skill task and positive coping strategy to manage mood    Attendees: 5/10         Attendance: Attended    Patient's Goal:  Attend group daily     Interventions/techniques: Other Exercise    Follows Directions: Followed directions    Interactions: Interacted appropriately    Mental Status: Calm    Behavior/appearance: Cooperative and Motivated    Goals Achieved: Able to engage in interactions and Able to listen to others      Additional Notes: Attended group and actively participated in exercise routine.  Receptive to information shared relating  to benefits of exercise    Lorne Menchaca

## 2021-09-03 NOTE — BH NOTES
Patient requested that she take her Abilify that is currently scheduled to start on 9-3-21 on days to be given on nights. \"I have been taking Abilify at night time since May\". \"It works best for [employer]". Dr. Candie Kennedy called-order modified.  Pharmacy called for a \"NOW\" dose

## 2021-09-03 NOTE — GROUP NOTE
[facility] GROUP DOCUMENTATION INDIVIDUAL                                                                          Group Therapy Note    Date: 9/3/2021    Group Start Time: 1030  Group End Time: 1100  Group Topic: Nursing    SRM 2  NON ACUTE    Kaitlynn Erickson RN    IP [address] [facility] GROUP DOCUMENTATION GROUP    Group Therapy Note    Attendees:          Attendance: Attended    Patient's Goal:      Interventions/techniques: Validated    Follows Directions:  Followed directions    Interactions: Interacted appropriately    Mental Status: Calm    Behavior/appearance: Cooperative    Goals Achieved: Able to engage in interactions      Additional Notes:  Group Title: Positive Thoughts     Issa Mccabe RN

## 2021-09-03 NOTE — BH NOTES
Patient approachable. Pleasant,calm and cooperative. Med-diet compliant. Denies pain. Comfortable. States that her depression and anxiety is a 7/10. Denies SI,HI and AVH's. Ambulatory. Interacts with unit peers and staff well. [facility] Attends some PSR. Review of medication tonight.

## 2021-09-03 NOTE — GROUP NOTE
IP  GROUP DOCUMENTATION INDIVIDUAL                                                                          Group Therapy Note    Date: 9/3/2021    Group Start Time: 1100  Group End Time: 1200  Group Topic: Process Group - Inpatient    SRM CARE MANAGEMENT    Cristiane Koroma    IP [address] [facility] GROUP DOCUMENTATION GROUP    Group Therapy Note    Writer asked pts how they were feeling as a check-in question. Pts were then to have an open and guided discussion regarding anger, what that means to them along with anger management related topics. Writer encouraged pts to participate in an anger ball activity that asked probing questions surrounding pt self-reflection and coping mechanisms that pts can identify as helpful to them. Attendees: 8/10         Attendance: Attended    Patient's Goal:  Pt responded to check-in that she was feeling good    Interventions/techniques: Informed, Validated and Supported    Follows Directions: Followed directions    Interactions: Interacted appropriately    Mental Status: Calm and Congruent    Behavior/appearance: Attentive, Cooperative and Neatly groomed    Goals Achieved: Able to engage in interactions, Able to listen to others, Able to give feedback to another, Able to reflect/comment on own behavior, Able to manage/cope with feelings, Able to receive feedback, Able to self-disclose and Displayed empathy      Additional Notes:   Pt was attentive during group. Pt participated in group activity and was able to self-disclose. Pt was able to talk about anger and practice self-reflection.  Pt did not respond in depth with answers, and participated minimally    Marco Antonio Young

## 2021-09-04 PROCEDURE — 65220000003 HC RM SEMIPRIVATE PSYCH

## 2021-09-04 PROCEDURE — 74011250637 HC RX REV CODE- 250/637: Performed by: FAMILY MEDICINE

## 2021-09-04 PROCEDURE — 74011250637 HC RX REV CODE- 250/637: Performed by: PSYCHIATRY & NEUROLOGY

## 2021-09-04 RX ORDER — LAMOTRIGINE 25 MG/1
25 TABLET ORAL DAILY
Status: DISCONTINUED | OUTPATIENT
Start: 2021-09-05 | End: 2021-09-07

## 2021-09-04 RX ORDER — TRAZODONE HYDROCHLORIDE 50 MG/1
50 TABLET ORAL
Status: DISCONTINUED | OUTPATIENT
Start: 2021-09-04 | End: 2021-09-08

## 2021-09-04 RX ADMIN — METOPROLOL SUCCINATE 25 MG: 25 TABLET, EXTENDED RELEASE ORAL at 08:26

## 2021-09-04 RX ADMIN — TRAZODONE HYDROCHLORIDE 50 MG: 50 TABLET ORAL at 21:18

## 2021-09-04 RX ADMIN — LINACLOTIDE 290 MCG: 290 CAPSULE, GELATIN COATED ORAL at 08:26

## 2021-09-04 RX ADMIN — ARIPIPRAZOLE 5 MG: 5 TABLET ORAL at 21:18

## 2021-09-04 NOTE — GROUP NOTE
J[facility]  GROUP DOCUMENTATION INDIVIDUAL                                                                          Group Therapy Note    Date: 9/4/2021    Group Start Time: 1030  Group End Time: 1100  Group Topic: Nursing    SRM 2 BEHA TH ACUTE    Jeffery Acuna LPN IP  GROUP DOCUMENTATION GROUP    Group Therapy Note    Attendees:         Attendance: Attended    Patient's Goal:      Interventions/techniques: Supported    Follows Directions:  Followed directions    Interactions: Interacted appropriately    Mental Status: Calm    Behavior/appearance: Cooperative    Goals Achieved: Discussed self-esteem issues      Additional Notes:      Per Lieberman LPN

## 2021-09-04 NOTE — GROUP NOTE
IP  GROUP DOCUMENTATION INDIVIDUAL                                                                          Group Therapy Note    Date: 9/4/2021    Group Start Time: 0026  Group End Time: 1400  Group Topic: Reflection/Relaxation    SRM 2  NON ACUTE    Yennifer Harmon    [facility] GROUP DOCUMENTATION GROUP    Group Therapy Note    Attendees: 7/12  Facilitated structured relaxation group as positive way to cope and manage daily stressors. Introduced relaxation technique using [place]andal[place] and instrumental music to practice relaxation in group. Attendance: Attended    Patient's Goal:  STG: Attends activities and groups       Interventions/techniques: Informed and Supported    Follows Directions: Followed directions    Interactions: Interacted appropriately    Mental Status: Calm and Flat    Behavior/appearance: Attentive and Cooperative    Goals Achieved: Able to engage in interactions and Able to listen to others      Additional Notes: Attended group and actively participated in group. Was receptive to intervention. Verbalized group was positive to relax and could do this technique outside of the hospital environment at home. Verbalized enjoyment.      Darin Osborne, CTRS

## 2021-09-04 NOTE — GROUP NOTE
IP  GROUP DOCUMENTATION INDIVIDUAL                                                                          Group Therapy Note    Date: 9/4/2021    Group Start Time: 0915  Group End Time: 2203  Group Topic: Education Group - Inpatient    SRM 2  NON ACUTE    Ivelisse Pena    IP [address] [facility] GROUP DOCUMENTATION GROUP    Group Therapy Note    Attendees: 6/13    Facilitated structured group discussion to define boundaries, identify healthy and unhealthy boundaries and to establish boundaries for those that are in our [place] of support. Attendance: Attended    Patient's Goal: STG: Attends activities and groups         Interventions/techniques: Informed, Provide feedback and Supported    Follows Directions: Followed directions    Interactions: Interacted appropriately    Mental Status: Calm and Flat    Behavior/appearance: Attentive and Cooperative    Goals Achieved: Able to engage in interactions and Able to listen to others      Additional Notes: Pt participated in group discussion. Accepted materials provided in group. Was able to define boundaries and identify healthy and unhealthy boundaries. Recognized need for boundaries and was able to identify those that are in their [place] of support and recognized boundaries that are needed to be established with those supports. Verbalized she has few people in her [place] but those she lets in are close to her but recognized need to establish healthier boundaries.      Caty Johnson, CTRS

## 2021-09-04 NOTE — PROGRESS NOTES
Problem: Suicide  Goal: Interventions  Outcome: Progressing Towards Goal     Problem: Falls - Risk of  Goal: *Absence of Falls  Description: Document Miriam Spare Fall Risk and appropriate interventions in the flowsheet. Outcome: Progressing Towards Goal  Note: Fall Risk Interventions:     Medication Interventions: Evaluate medications/consider consulting pharmacy    Patient has not had any self-harm episodes this shift. Patient has not fallen so far this shift. Patient remains on close observation. Patient has been alert, oriented, cooperative and pleasant. Patient said she has been \"alright\" today. She admitted to depression rated 6/10 and anxiety rated 7/10. She denied any SI or HI stating, \"not today. \"  She admitted to some SI yesterday. She could not attribute her mood to anything specific; it is just how she feels. Continue to assess. Medication compliant. Since going to bed, patient has been laying down quietly with her eyes closed.

## 2021-09-04 NOTE — BH NOTES
Client is pleasant and cooperative. Alert and oriented x 2. Appearance is neat and clean. She  has a good appetite and reports sleeping pretty good. Denies feeling suicidal or homicidal.Client reports that she has a lost of stress taking care of her young son and still grieving over death of her mom. Denies feeling suicidal or homicidal.Remains on close obsevation for safety.

## 2021-09-05 PROCEDURE — 74011250637 HC RX REV CODE- 250/637: Performed by: PSYCHIATRY & NEUROLOGY

## 2021-09-05 PROCEDURE — 74011250637 HC RX REV CODE- 250/637: Performed by: FAMILY MEDICINE

## 2021-09-05 PROCEDURE — 65220000003 HC RM SEMIPRIVATE PSYCH

## 2021-09-05 RX ADMIN — LAMOTRIGINE 25 MG: 25 TABLET ORAL at 08:01

## 2021-09-05 RX ADMIN — ARIPIPRAZOLE 5 MG: 5 TABLET ORAL at 22:11

## 2021-09-05 RX ADMIN — METOPROLOL SUCCINATE 25 MG: 25 TABLET, EXTENDED RELEASE ORAL at 08:01

## 2021-09-05 RX ADMIN — TRAZODONE HYDROCHLORIDE 50 MG: 50 TABLET ORAL at 22:11

## 2021-09-05 RX ADMIN — HYDROXYZINE HYDROCHLORIDE 50 MG: 50 TABLET, FILM COATED ORAL at 22:15

## 2021-09-05 RX ADMIN — LINACLOTIDE 290 MCG: 290 CAPSULE, GELATIN COATED ORAL at 08:02

## 2021-09-05 NOTE — GROUP NOTE
[facility] GROUP DOCUMENTATION INDIVIDUAL                                                                          Group Therapy Note    Date: 9/5/2021    Group Start Time: 0930  Group End Time: 0916  Group Topic: Education Group - Inpatient    SRM 2  NON ACUTE    Yeyo Knapp    [facility] GROUP DOCUMENTATION GROUP    Group Therapy Note    Attendees: 8/14  Facilitated group discussion to identify The Trauma Response with discussion related to the brain, triggers, Fight, Flight or Freeze and positive ways to cope with anxiety       Attendance: Attended    Patient's Goal: STG:  Verbalizes alternative ways of dealing with maladaptive feelings/behaviors        Interventions/techniques: Informed, Provide feedback and Supported    Follows Directions: Followed directions    Interactions: Interacted appropriately    Mental Status: Calm and Flat    Behavior/appearance: Attentive and Cooperative    Goals Achieved: Able to engage in interactions and Able to listen to others      Additional Notes: Attended group and actively engaged in group discussion. Was receptive to intervention and participated in discussion with cues and encouragement. PT was able to verbalize response to symptoms of anxiety, and ways to cope with anxiety. PT identified crocheting as positive ways to cope.     Lance Alegre, CTRS

## 2021-09-05 NOTE — GROUP NOTE
IP  GROUP DOCUMENTATION INDIVIDUAL                                                                          Group Therapy Note    Date: 9/5/2021    Group Start Time: 2765  Group End Time: 1400  Group Topic: Reflection/Relaxation    SRM 2  NON ACUTE    Daryl Saginaw    IP [address] [facility] GROUP DOCUMENTATION GROUP    Group Therapy Note    Attendees: 8/14    Facilitated structured group to introduce Mindfulness and Meditation as positive way to cope and manage daily stressors. Introduced relaxation technique using Guided Imagery to practice relaxation and mindfulness in group. Attendance: Attended    Patient's Goal:  STG:  Verbalizes alternative ways of dealing with maladaptive feelings/behaviors      Interventions/techniques: Informed and Supported    Follows Directions: Followed directions    Interactions: Interacted appropriately    Mental Status: Calm    Behavior/appearance: Attentive    Goals Achieved: Able to listen to others      Additional Notes: Attended group and was receptive to intervention. Received material provided in group and was attentive during group discussion. Participated in relaxation technique of Listening to guided imagery technique. Verbalized enjoyment of group. Attended entire session.      MONIQUE Melendez

## 2021-09-05 NOTE — GROUP NOTE
[facility] GROUP DOCUMENTATION INDIVIDUAL                                                                          Group Therapy Note    Date: 9/5/2021    Group Start Time: 1045  Group End Time: 1115  Group Topic: Nursing    SRM 2 BEHA HLTH ACUTE London Beat, LPN    IP BH GROUP DOCUMENTATION GROUP    Group Therapy Note    Attendees:          Attendance: Attended    Patient's Goal:      Interventions/techniques: Supported    Follows Directions:  Followed directions    Interactions: Interacted appropriately    Mental Status: Calm    Behavior/appearance: Cooperative    Goals Achieved: Able to manage/cope with feelings      Additional Notes:      Prosper Stephens LPN

## 2021-09-05 NOTE — BH NOTES
Client is pleasant and cooperative. Alert and oriented x 3. Appearance is neat and clean. Takes meds as prescribed and denies any side effects. Attends all scheduled groups and unit activities. Social with peers and spends free time watching movies. Denies feeling suicidal or homicidal.Still reports some  Anxiety and depression. Remains on close observation for safety.

## 2021-09-05 NOTE — PROGRESS NOTES
Problem: Falls - Risk of  Goal: *Absence of Falls  Description: Document Yanira Peguero Fall Risk and appropriate interventions in the flowsheet. Outcome: Progressing Towards Goal  Note: Fall Risk Interventions:     Medication Interventions: Evaluate medications/consider consulting pharmacy     No falls so far this shift. Patient remains on close observation. Patient has been alert, oriented, cooperative and pleasant. Patient has been up on the unit watching TV. Patient said she is [place]. \"  She said  Her mood is \"okay. \"  She rated her depression as 5/10 and anxiety as 6/10. Patient denied SI or HI. Patient was medication compliant. Continue to assess. Since going to bed, patient has been laying down quietly with her eyes closed.

## 2021-09-06 PROCEDURE — 74011250637 HC RX REV CODE- 250/637: Performed by: PSYCHIATRY & NEUROLOGY

## 2021-09-06 PROCEDURE — 74011250637 HC RX REV CODE- 250/637: Performed by: FAMILY MEDICINE

## 2021-09-06 PROCEDURE — 65220000003 HC RM SEMIPRIVATE PSYCH

## 2021-09-06 RX ADMIN — METOPROLOL SUCCINATE 25 MG: 25 TABLET, EXTENDED RELEASE ORAL at 08:33

## 2021-09-06 RX ADMIN — TRAZODONE HYDROCHLORIDE 50 MG: 50 TABLET ORAL at 21:31

## 2021-09-06 RX ADMIN — ARIPIPRAZOLE 5 MG: 5 TABLET ORAL at 21:31

## 2021-09-06 RX ADMIN — LAMOTRIGINE 25 MG: 25 TABLET ORAL at 08:35

## 2021-09-06 RX ADMIN — LINACLOTIDE 290 MCG: 290 CAPSULE, GELATIN COATED ORAL at 07:59

## 2021-09-06 NOTE — GROUP NOTE
JAG  GROUP DOCUMENTATION INDIVIDUAL                                                                          Group Therapy Note    Date: 9/6/2021    Group Start Time: 1115  Group End Time: 1200  Group Topic: Process Group - Inpatient    SRM 2 [facility] HL[facility] ACUTE    Giovanna Gonzalez    [facility] GROUP DOCUMENTATION GROUP    Group Therapy Note    Attendees: 6/14    Process: pts were asked to use a colour to describe their mood as a check in question. Pts were then asked to discuss what they would say to themselves 5 years ago and share things they want to let go of. Pts were then asked to share what they want to see for themselves in five years and how they will work towards that. Pts were then asked to reflect on group         Attendance: Attended    Patient's Goal:  Attend activities and groups     Interventions/techniques: Validated, Promoted peer support, Provide feedback and Supported    Follows Directions: Followed directions    Interactions: Interacted appropriately    Mental Status: Calm, Congruent and Flat    Behavior/appearance: Attentive, Motivated, Neatly groomed and Withdrawn/quiet    Goals Achieved: Able to listen to others      Additional Notes:  Pt was quiet throughout group but participated and actively listened to peers.  Pt is making progress towards goals by attending and participating in group    [facility]

## 2021-09-06 NOTE — PROGRESS NOTES
Problem: Falls - Risk of  Goal: *Absence of Falls  Description: Document Linda Croft Fall Risk and appropriate interventions in the flowsheet. Outcome: Progressing Towards Goal  Note: Fall Risk Interventions:            Medication Interventions: Evaluate medications/consider consulting pharmacy     -pt ambulated independently and without difficulty.

## 2021-09-06 NOTE — GROUP NOTE
JAG  GROUP DOCUMENTATION INDIVIDUAL                                                                          Group Therapy Note    Date: 9/6/2021    Group Start Time: 2789  Group End Time: 1800  Group Topic: Nursing    [address], RN    IP [address] [facility] GROUP DOCUMENTATION GROUP    Group Therapy Note    Attendees: 8         Attendance: Attended    Patient's Goal:  Patient's goal was to work towards discharge. Interventions/techniques: Validated    Follows Directions: Followed directions    Interactions: Interacted appropriately    Mental Status: Calm    Behavior/appearance: Attentive    Goals Achieved: Able to engage in interactions      Additional Notes:  Patient stated she had enjoyed all groups today.     Denise Caballero RN

## 2021-09-06 NOTE — GROUP NOTE
IP  GROUP DOCUMENTATION INDIVIDUAL                                                                          Group Therapy Note    Date: 9/6/2021    Group Start Time: 8639  Group End Time: 1400  Group Topic: Recreational/Music Therapy    SRM 2  NON ACUTE    Jovana Raymond    IP 1150 [facility] GROUP DOCUMENTATION GROUP    Group Therapy Note    Attendees: 6/14  Facilitated structured exercise group to introduce healthy leisure skills as positive way to cope and manage mood. Attendance: Attended    Patient's Goal:  STG: Attends activities and groups      Interventions/techniques: Informed and Supported    Follows Directions: Followed directions    Interactions: Interacted appropriately    Mental Status: Calm and Flat    Behavior/appearance: Attentive and Cooperative    Goals Achieved: Able to engage in interactions and Able to listen to others      Additional Notes: Attended exercise group. Discussed the benefits of exercise on mental health and participated in exercise task  Chair YOGA.  Was receptive to intervention and verbalized enjoyment    Brigid Krause, [address]

## 2021-09-06 NOTE — BH NOTES
Patient up to the dayroom for meals, ate 100%. Medication compliant. No side effects noted. Pleasant on approach. Attends & participates in groups. Denies SI/HI. Denies AVH. No physical complaints voiced. Remains on CO. Continue to monitor. Presently sitting in the dayroom, social with select peers.

## 2021-09-06 NOTE — BH NOTES
Throughout the evening, the pt has been up in the milieu watching T.V., playing games, and interacting with her peers. Prior to getting her medications, she was talking on the phone. The pt rated her anxiety 7/10 and her depression a 4/10. The pt stated that she has not been feeling good and not sleeping well. The pt is med  Compliant and was given prn Trazodone for sleep along with prn hydroxyzine for her anxiety. She denies having any SI, HI, or hallucinations. Her affect has been WNLs and she has been smiling at times. The pt accepted snacks and something to drink. She remains A+O and ambulates independently. No c/o pain or discomfort. The pt has been resting quietly in bed with her eyes closed. No distress noted or voiced. Respirations are quiet and unlabored. She remains on close observation for safety.

## 2021-09-06 NOTE — GROUP NOTE
IP  GROUP DOCUMENTATION INDIVIDUAL                                                                          Group Therapy Note    Date: 9/6/2021    Group Start Time: 0920  Group End Time: 5669  Group Topic: Education Group - Inpatient    SRM 2 BH NON ACUTE    Lila Aissatou    IP 1150 [facility] GROUP DOCUMENTATION GROUP    Group Therapy Note    Attendees: 6/14    Facilitated structured group discussion to introduce Diaphragmatic Breathing technique as a positive way to manage stress and practice technique in group. Attendance: Attended    Patient's Goal:  STG: Attends activities and groups        Interventions/techniques: Informed, Provide feedback and Supported    Follows Directions: Followed directions    Interactions: Interacted appropriately    Mental Status: Calm and Flat    Behavior/appearance: Attentive and Cooperative    Goals Achieved: Able to engage in interactions and Able to listen to others      Additional Notes: Attended group and actively participated. Received materials presented and participated in group discussion. Was able to practice relaxation technique successfully. Verbalized enjoyment.    Eliecer Camejo, CTRS

## 2021-09-07 PROCEDURE — 74011250637 HC RX REV CODE- 250/637: Performed by: PSYCHIATRY & NEUROLOGY

## 2021-09-07 PROCEDURE — 74011250637 HC RX REV CODE- 250/637: Performed by: FAMILY MEDICINE

## 2021-09-07 PROCEDURE — 65220000003 HC RM SEMIPRIVATE PSYCH

## 2021-09-07 RX ORDER — LAMOTRIGINE 25 MG/1
50 TABLET ORAL DAILY
Status: DISCONTINUED | OUTPATIENT
Start: 2021-09-08 | End: 2021-09-09 | Stop reason: HOSPADM

## 2021-09-07 RX ADMIN — LAMOTRIGINE 25 MG: 25 TABLET ORAL at 08:05

## 2021-09-07 RX ADMIN — ARIPIPRAZOLE 5 MG: 5 TABLET ORAL at 21:45

## 2021-09-07 RX ADMIN — LINACLOTIDE 290 MCG: 290 CAPSULE, GELATIN COATED ORAL at 08:04

## 2021-09-07 RX ADMIN — TRAZODONE HYDROCHLORIDE 50 MG: 50 TABLET ORAL at 21:45

## 2021-09-07 RX ADMIN — METOPROLOL SUCCINATE 25 MG: 25 TABLET, EXTENDED RELEASE ORAL at 08:05

## 2021-09-07 RX ADMIN — TRAZODONE HYDROCHLORIDE 50 MG: 50 TABLET ORAL at 21:47

## 2021-09-07 NOTE — BH NOTES
DAYSHIFT NOTE:     Patient sitting in the dayroom this morning amongst her peers and socializing. Patient is pleasant and smiling approach and soft spoken. Patient rated her depression as a 4/10. Anxiety as a 7/10. Denies SI/HI. Denies AH/VH. Patient is medication compliant and asked what the mg of her metoprolol was. Patient did not elaborate in much conversation and just answered the basic questions. Patient has attended groups. Patient is up for her meals. Patient noted to walk around on the unit at times. Close observations continued to ensure patient safety.

## 2021-09-07 NOTE — GROUP NOTE
IP  GROUP DOCUMENTATION INDIVIDUAL                                                                          Group Therapy Note    Date: 9/7/2021    Group Start Time: 1020  Group End Time: 1050  Group Topic: Nursing     Se Nain Emmanuel, RN    IP 1150 [facility] GROUP DOCUMENTATION GROUP    Group Therapy Note    Attendees: 5         Attendance: Attended    Patient's Goal:      Interventions/techniques: Informed, Validated and Supported    Follows Directions: Followed directions    Interactions: Interacted appropriately    Mental Status: Calm    Behavior/appearance: Attentive, Cooperative and Motivated    Goals Achieved: Able to engage in interactions, Able to listen to others, Able to give feedback to another and Able to reflect/comment on own behavior      Additional Notes: Attended & participated in group activity & discussion.     Jessica Leach RN

## 2021-09-07 NOTE — GROUP NOTE
[facility] GROUP DOCUMENTATION INDIVIDUAL                                                                          Group Therapy Note    Date: 9/7/2021    Group Start Time: 0935  Group End Time: 6168  Group Topic: Education Group - Inpatient    SRM 2  NON ACUTE    Messi Barnard    JAG [facility] GROUP DOCUMENTATION GROUP    Group Therapy Note    Facilitated discussion focused on identifying feelings and their triggers and the changes that need to be made to experience more comfortable feelings and less uncomfortable feelings. Attendees: 4/13         Attendance: Attended    Patient's Goal:  Attend group daily     Interventions/techniques: Informed and Supported    Follows Directions: Followed directions    Interactions: Interacted appropriately    Mental Status: Calm    Behavior/appearance: Cooperative    Goals Achieved: Able to engage in interactions, Able to listen to others and Able to self-disclose      Additional Notes:  Receptive to information and engaged. Pt was able to identify different feelings she has experienced and its triggers.  Pt shared she was feeling \"depressed\" prior admission because of \"the loss of family member, work and responsibilities\" and currently feeling \"anxious\" because of \"lack of sleep, missing her family and fear of the unknown\"  Pt shared in order to feel more comfortable feelings she need to [facility] more questions, talk to her family about grief, get more sleep, make a manageable schedule for her everyday responsibilities and continue therapy\"      Maria Elena Smith

## 2021-09-07 NOTE — PROGRESS NOTES
Discussed case interviewed patient  Taking and tolerating medications well  She reports she discussed getting back on her Lamictal with Dr. Kimberlee Quintero  Now she is willing to consider and do this  Denies acute psychotic symptoms  Less depressed less anxious  Reports unstable mood  At times she feels depressed  Sleep is fair appetite is fair    Mental status exam  Alert oriented cooperative  Speech is goal-directed  Mood is labile  Affect is appropriate  Thought process linear and logical  Insight and judgment fair    Recommendations  I will resume Lamictal as as planned starting a low-dose  Discussed safety issues  Follow-up with psychiatric and tomorrow

## 2021-09-07 NOTE — PROGRESS NOTES
Discussed case interviewed patient  Taking and tolerating medications well  Denies acute psychotic symptoms  Denies intention to harm self or others  No overt aggression no suicidality  Benefits from current medication regimen  Interacts with peers and staff  Attends groups  Mood is reportedly more stable    Mental status exam  Alert oriented cooperative  Speech is goal-directed soft tone  Mood is depressed  Affect is restricted  Thought process linear and logical  Insight and judgment fair    Recommendations  Continue inpatient treatments  She appears to be making some progress  Follow-up with me in the team again tomorrow

## 2021-09-07 NOTE — BH NOTES
Behavioral Health Treatment Team Note     Patient goal(s) for today: Pt reports goal is to go home as soon as possible  Treatment team focus/goals: Continue treatment    Progress note: Pt presents standing in hallway, soft spokes, flat and somewhat depressed affect though reports that she feels \"good\" today. Pt denying any SI.HI.AVH. Pt reports that she is not sleeping well, reports very restless. Pt reports that she has not slept well since being here. Pt reports that she only got 3-4 hours of sleep last night. Pt reports that she feels \"good\" today. Pt asked about leaving and D19 process explained. When pt found out if she were to be dismissed by D19, she would be leaving AMA and not get any meds etc, pt reported that she will continue to stay vol. Pt became tearful and when writer asked what was wrong, pt states \"nothing. \"  Inpatient level of care indicated due to need for continued medication management, case management, and therapeutic intervention needed.     LOS:  5  Expected LOS: 5-7 days    Insurance info/prescription coverage:  (1) [facility] PPO  Date of last family contact:  None at this time  Family requesting physician contact today:  no  Discharge plan:  Home or to crisis with outpatient follow up  Guns in the home:  no   Outpatient provider(s):  Therapist [facility]    Participating treatment team members: Octavia Blackburn, * (assigned SW), Susanne Han, DEVON

## 2021-09-07 NOTE — GROUP NOTE
IP  GROUP DOCUMENTATION INDIVIDUAL                                                                          Group Therapy Note    Date: 9/7/2021    Group Start Time: 1320  Group End Time: 6715  Group Topic: Recreational/Music Therapy    SRM 2  NON ACUTE    Shanice Herrera    IP 1150 [facility] GROUP DOCUMENTATION GROUP    Group Therapy Note    Facilitated leisure skills group to reinforce positive coping through music, social interaction, group participation and arts/crafts    Attendees: 9/13         Attendance: Attended    Patient's Goal:  Attend group daily     Interventions/techniques: Art integration and Supported    Follows Directions: Followed directions    Interactions: Interacted appropriately    Mental Status: Calm    Behavior/appearance: Cooperative    Goals Achieved: Able to engage in interactions and Able to listen to others      Additional Notes:  Receptive to listening to music and songs she selected while working on leisure task. Interacted with peers and staff.     Harlan Ty, CTRS

## 2021-09-07 NOTE — PROGRESS NOTES
Problem: Falls - Risk of  Goal: *Absence of Falls  Description: Document Shanna Kemp Fall Risk and appropriate interventions in the flowsheet. Outcome: Progressing Towards Goal  Note: Fall Risk Interventions:     Medication Interventions: Evaluate medications/consider consulting pharmacy    Problem: Anxiety-Behavioral Health (Adult/Pediatric)  Goal: *STG/LTG: Complies with medication therapy  Outcome: Progressing Towards Goal     Problem: Depressed Mood (Adult/Pediatric)  Goal: *STG: Remains safe in hospital  Outcome: Progressing Towards Goal     Problem: Depressed Mood (Adult/Pediatric)  Goal: *STG: Complies with medication therapy  Outcome: Progressing Towards Goal   Patient has not fallen so far this shift. Patient was medication compliant. Patient has been safe so far this shift. Patient was medication compliant. Patient remains on close observation. Patient has been alert, oriented, cooperative and pleasant. Patient has been up on the unit socializing with peers. Patient said she was \"alright. \"  She said her mood was \"okay. \"  She then rated her depression as 4/10. She rated her anxiety as 7/10 due to \"not knowing with is going on\" with her discharge and plans. She was encouraged to speak with her . She complained of a bump, like a pimple, and will speak with the physician if it is bothersome. Medication compliant. Continue to assess. Since going to bed, patient has been laying down quietly with her eyes closed.

## 2021-09-07 NOTE — GROUP NOTE
JAG  GROUP DOCUMENTATION INDIVIDUAL                                                                          Group Therapy Note    Date: 9/7/2021    Group Start Time: 1115  Group End Time: 8543  Group Topic: Process Group - Inpatient    SRM 2 BEHA TH ACUTE    Giovanna Gonzalez    JAG  GROUP DOCUMENTATION GROUP    Group Therapy Note    Attendees: 7/13    Process: pts were asked to share one positive thing about themselves as a check in question. Pts were then encouraged to share what brought them to the hospital and provide positive feedback to one another. Pts were then asked to reflect group         Attendance: Attended    Patient's Goal:  Attend activities and groups     Interventions/techniques: Validated, Promoted peer support, Provide feedback and Supported    Follows Directions: Followed directions    Interactions: Interacted appropriately    Mental Status: Calm, Congruent and Flat    Behavior/appearance: Attentive, Motivated, Neatly groomed and Withdrawn/quiet    Goals Achieved: Able to engage in interactions and Able to listen to others      Additional Notes:  Ppt was quiet throughout group but actively listening to peers.  Pt is making progress towards goals by attending and participating in group    [facility]

## 2021-09-08 PROCEDURE — 65220000003 HC RM SEMIPRIVATE PSYCH

## 2021-09-08 PROCEDURE — 74011250637 HC RX REV CODE- 250/637: Performed by: PSYCHIATRY & NEUROLOGY

## 2021-09-08 PROCEDURE — 74011250637 HC RX REV CODE- 250/637: Performed by: FAMILY MEDICINE

## 2021-09-08 RX ORDER — TRAZODONE HYDROCHLORIDE 50 MG/1
100 TABLET ORAL
Status: DISCONTINUED | OUTPATIENT
Start: 2021-09-08 | End: 2021-09-09 | Stop reason: HOSPADM

## 2021-09-08 RX ADMIN — ARIPIPRAZOLE 5 MG: 5 TABLET ORAL at 21:31

## 2021-09-08 RX ADMIN — LAMOTRIGINE 50 MG: 25 TABLET ORAL at 08:06

## 2021-09-08 RX ADMIN — TRAZODONE HYDROCHLORIDE 100 MG: 50 TABLET ORAL at 21:31

## 2021-09-08 RX ADMIN — METOPROLOL SUCCINATE 25 MG: 25 TABLET, EXTENDED RELEASE ORAL at 08:06

## 2021-09-08 RX ADMIN — LINACLOTIDE 290 MCG: 290 CAPSULE, GELATIN COATED ORAL at 08:06

## 2021-09-08 NOTE — PROGRESS NOTES
Progress Note  Date:2021       Room:[facility]  Patient Name:Arianna Gonzalez     YOB: 1990     Age:31 y.o. Subjective    Subjective   She had expressed difficulty with sleep. She still focused on wanting her home. Denies any active plan for suicide or homicide. Continue to encourage to address reasons leading to her hospitalization. Denies any hallucinations. No acute distress noted. She has not had any family meeting with her significant other. Mental status examination-patient is alert, oriented to name place person limited historian superficially and engagement. Denies any active plan of suicide or homicide. No evidence of any active psychosis. Insight judgment coping remains limited  Review of Systems  Objective         Vitals Last 24 Hours:  TEMPERATURE:  Temp  Av.4 °F (36.9 °C)  Min: 98.1 °F (36.7 °C)  Max: 98.5 °F (36.9 °C)  RESPIRATIONS RANGE: Resp  Av.3  Min: 16  Max: 18  PULSE OXIMETRY RANGE: No data recorded  PULSE RANGE: Pulse  Av  Min: 92  Max: 101  BLOOD PRESSURE RANGE: Systolic (94PLP), AXD:474 , Min:109 , QTQ:450   ; Diastolic (52CHK), DAMION:64, Min:77, Max:80    I/O (24Hr): No intake or output data in the 24 hours ending 21 1254  Objective  Labs/Imaging/Diagnostics    Labs:  CBC:No results for input(s): WBC, RBC, HGB, HCT, MCV, RDW, PLT, HGBEXT, HCTEXT, PLTEXT in the last 72 hours. CHEMISTRIES:No results for input(s): NA, K, CL, CO2, BUN, CA, PHOS, MG in the last 72 hours. No lab exists for component: CREATININE, GLUCOSEPT/INR:No results for input(s): INR, INREXT in the last 72 hours. No lab exists for component: PROTIME  APTT:No results for input(s): APTT in the last 72 hours. LIVER PROFILE:No results for input(s): AST, ALT in the last 72 hours.     No lab exists for component: Winifred Deal, ALKPHOS  Lab Results   Component Value Date/Time    ALT (SGPT) 41 2021 02:30 PM    AST (SGOT) 19 09/02/2021 02:30 PM    Alk. phosphatase 85 09/02/2021 02:30 PM    Bilirubin, direct 0.07 01/06/2021 12:48 PM    Bilirubin, total 0.3 09/02/2021 02:30 PM       Imaging Last 24 Hours:  No results found.   Assessment//Plan   Active Problems:    Major depression (9/2/2021)      Suicidal ideations (9/2/2021)      Assessment & Plan  Increase trazodone 25 mg at bedtime to address insomnia  Patient discussed with treatment team meeting  Family meeting with significant other    Support system  Safety planning  No side effects voiced        Current Facility-Administered Medications:     traZODone (DESYREL) tablet 100 mg, 100 mg, Oral, QHS, Kami Rodriguez MD    lamoTRIgine (LaMICtal) tablet 50 mg, 50 mg, Oral, DAILY, Kami Rodriguez MD, 50 mg at 09/08/21 0806    hydrOXYzine HCL (ATARAX) tablet 50 mg, 50 mg, Oral, TID PRN, Maria Dolores Arango MD, 50 mg at 09/05/21 2215    traZODone (DESYREL) tablet 50 mg, 50 mg, Oral, QHS PRN, Kami Rodriguez MD, 50 mg at 09/07/21 2147    acetaminophen (TYLENOL) tablet 650 mg, 650 mg, Oral, Q4H PRN, Kami Rodriguez MD    magnesium hydroxide (MILK OF MAGNESIA) 400 mg/5 mL oral suspension 30 mL, 30 mL, Oral, DAILY PRN, Kami Rodriguez MD    linaCLOtide (LINZESS) capsule 290 mcg, 290 mcg, Oral, ACB, Kami Rodriguez MD, 290 mcg at 09/08/21 0806    metoprolol succinate (TOPROL-XL) XL tablet 25 mg, 25 mg, Oral, DAILY, Rajiv Coreas MD, 25 mg at 09/08/21 0806    ARIPiprazole (ABILIFY) tablet 5 mg, 5 mg, Oral, QHS, Kami Rodriguez MD, 5 mg at 09/07/21 2145  Electronically signed by Sherry Ramirez MD on 9/8/2021 at 12:54 PM

## 2021-09-08 NOTE — PROGRESS NOTES
Progress Note  Date:2021       Room:[facility]  Patient Name:Arianna Gonzalez     YOB: 1990     Age:31 y.o. Subjective    Subjective   Patient this morning reports feeling okay. She is focused on being discharged. She also has reported having difficulty with sleep for up to 3 to 4 hours every night. She did not express those concerns to me when seen her in the morning. She was more focused on wanting to be discharged. Denied any active plan of suicide or homicide. Mental status examination-patient is alert oriented to name place person presents anxious with restricted affect. More focused on wanting to be discharged and getting back to her routines. Patient was encouraged to continue to engage in therapy to address her stressors and ways to cope and problem solve. No active evidence of psychosis noted Insight judgment and coping remains limited  Review of Systems  Objective         Vitals Last 24 Hours:  TEMPERATURE:  Temp  Av.2 °F (36.8 °C)  Min: 98.1 °F (36.7 °C)  Max: 98.2 °F (36.8 °C)  RESPIRATIONS RANGE: Resp  Av  Min: 16  Max: 18  PULSE OXIMETRY RANGE: No data recorded  PULSE RANGE: Pulse  Av.7  Min: 101  Max: 116  BLOOD PRESSURE RANGE: Systolic (23QIR), CZD:370 , Min:109 , DBK:364   ; Diastolic (97YRO), EQO:20, Min:77, Max:78    I/O (24Hr): No intake or output data in the 24 hours ending 218  Objective  Labs/Imaging/Diagnostics    Labs:  CBC:No results for input(s): WBC, RBC, HGB, HCT, MCV, RDW, PLT, HGBEXT, HCTEXT, PLTEXT in the last 72 hours. CHEMISTRIES:No results for input(s): NA, K, CL, CO2, BUN, CA, PHOS, MG in the last 72 hours. No lab exists for component: CREATININE, GLUCOSEPT/INR:No results for input(s): INR, INREXT in the last 72 hours. No lab exists for component: PROTIME  APTT:No results for input(s): APTT in the last 72 hours.   LIVER PROFILE:No results for input(s): AST, ALT in the last 72 hours.    No lab exists for component: Jeramy Kohler, ALKPHOS  Lab Results   Component Value Date/Time    ALT (SGPT) 41 09/02/2021 02:30 PM    AST (SGOT) 19 09/02/2021 02:30 PM    Alk. phosphatase 85 09/02/2021 02:30 PM    Bilirubin, direct 0.07 01/06/2021 12:48 PM    Bilirubin, total 0.3 09/02/2021 02:30 PM       Imaging Last 24 Hours:  No results found. Assessment//Plan   Active Problems:    Major depression (9/2/2021)      Suicidal ideations (9/2/2021)      Assessment & Plan  Increase her Lamictal to 50 mg p.o. daily to address underlying depression mood  Provided support psychoeducation  Patient more focused on wanting to be discharged  We will continue to do safety planning family meeting and process groups.   We will continue to assess stepdown for outpatient treatment as patient wants to do outpatient treatment      Current Facility-Administered Medications:     [START ON 9/8/2021] lamoTRIgine (LaMICtal) tablet 50 mg, 50 mg, Oral, DAILY, Kami Rodriguez MD    traZODone (DESYREL) tablet 50 mg, 50 mg, Oral, QHS, Vera Rubin MD, 50 mg at 09/07/21 2145    hydrOXYzine HCL (ATARAX) tablet 50 mg, 50 mg, Oral, TID PRN, Albina Vigil MD, 50 mg at 09/05/21 2215    traZODone (DESYREL) tablet 50 mg, 50 mg, Oral, QHS PRN, Albina Vigil MD, 50 mg at 09/07/21 2147    acetaminophen (TYLENOL) tablet 650 mg, 650 mg, Oral, Q4H PRN, Kami Rodriguez MD    magnesium hydroxide (MILK OF MAGNESIA) 400 mg/5 mL oral suspension 30 mL, 30 mL, Oral, DAILY PRN, Kami Rodriguez MD    linaCLOtide (LINZESS) capsule 290 mcg, 290 mcg, Oral, ACB, Kami Rodriguez MD, 290 mcg at 09/07/21 0804    metoprolol succinate (TOPROL-XL) XL tablet 25 mg, 25 mg, Oral, DAILY, Rajiv Coreas MD, 25 mg at 09/07/21 0805    ARIPiprazole (ABILIFY) tablet 5 mg, 5 mg, Oral, QHS, Kami Rodriguez MD, 5 mg at 09/07/21 2145  Electronically signed by Imelda Abel MD on 9/7/2021 at 10:28 PM

## 2021-09-08 NOTE — GROUP NOTE
IP  GROUP DOCUMENTATION INDIVIDUAL                                                                          Group Therapy Note    Date: 9/8/2021    Group Start Time: 0930  Group End Time: 1000  Group Topic: Nursing    SRM 2  NON ACUTE    Sj Mclean RN    IP 1150 [facility] GROUP DOCUMENTATION GROUP    Group Therapy Note    Attendees: 7         Attendance: Attended    Patient's Goal:  Manage anxiety related to poor sleep. Interventions/techniques: Validated    Follows Directions: Followed directions    Interactions: Interacted appropriately    Mental Status: Calm    Behavior/appearance: Cooperative    Goals Achieved: Discussed coping      Additional Notes:  Discussed importance of sleep hygiene and medication side effects.     Erin Bridges RN

## 2021-09-08 NOTE — PROGRESS NOTES
Problem: Falls - Risk of  Goal: *Absence of Falls  Description: Document Tanvi Figueroa Fall Risk and appropriate interventions in the flowsheet. Outcome: Progressing Towards Goal  Note: Fall Risk Interventions:    Medication Interventions: Teach patient to arise slowly    Problem: Anxiety-Behavioral Health (Adult/Pediatric)  Goal: *STG: Remains safe in hospital  Outcome: Progressing Towards Goal     Problem: Anxiety-Behavioral Health (Adult/Pediatric)  Goal: *STG/LTG: Complies with medication therapy  Outcome: Progressing Towards Goal     Problem: Depressed Mood (Adult/Pediatric)  Goal: *STG: Verbalizes anger, guilt, and other feelings in a constructive manor  Outcome: Progressing Towards Goal     Problem: Depressed Mood (Adult/Pediatric)  Goal: *STG: Remains safe in hospital  Outcome: Progressing Towards Goal     Problem: Depressed Mood (Adult/Pediatric)  Goal: *STG: Complies with medication therapy  Outcome: Progressing Towards Goal     Patient has not fallen so far this shift. Patient has been safe so far this shift. Patient was medication compliant. Patient was able to express her feelings. Patient remains on close observation. Patient has been alert, oriented, cooperative and pleasant. Patient said she feels \"farily good. \"  She rated her depression as 3/10 and her anxiety as 6/10. She denied any SI or HI. She agreed to take the Trazodone 50 HS and the Trazodone 50 PRN due to complaining of poor sleep. She then mentioned that she uses a CPAP at home and sleeps really well while using the CPAP. She will discuss with the physician if she has a need for Trazodone upon discharge if she sleeps well with her CPAP. She will also discuss with the psychiatrist the need for a medical reconsult due to having a sore bump on her abdomin. Medication compliant. Continue to assess. Since going to bed, patient has been laying down quietly with her eyes closed.

## 2021-09-08 NOTE — GROUP NOTE
JAG  GROUP DOCUMENTATION INDIVIDUAL                                                                          Group Therapy Note    Date: 9/8/2021    Group Start Time: 0910  Group End Time: 1066  Group Topic: [facility] Meeting    SRM 2 BH NON ACUTE    Rosemarie Lutz RN    IP 1150 [facility] GROUP DOCUMENTATION GROUP    Group Therapy Note    Attendees 7             Attendance: {[facility] GROUP DOC ATTENDANCE:77319}    Patient's Goal:  ***    Interventions/techniques: {[facility] GROUP DOC INTERVENTIONS/TECHNIQUES:39358}    Follows Directions: {[facility] GROUP DOC FOLLOWS DIRECTION:65984}    Interactions: {[facility] GROUP DOC INTERACTIONS:66591}    Mental Status: {[facility] GROUP DOC MENTAL STATUS:91483}    Behavior/appearance: {[facility] GROUP DOC BEHAVIOR/APPEARANCE:38072}    Goals Achieved: {[facility] GROUP DOC GOALS ACHIEVED:78912}      Additional Notes:  ***    Edwina Turner RN

## 2021-09-08 NOTE — BH NOTES
Behavioral Health Treatment Team Note     Patient goal(s) for today: Manage anxiety  Treatment team focus/goals: Continue medication management, group therapy    Progress note: Pt presented with a euthymic affect and congruent mood. Pt denied SI/HI and any AVH. Pt was well groomed and dressed in surgical gown. Pt reports her mood as being OK and that her level of anxiety was at 7/10. Pt has good insight about her mental condition and is open to treatment. Pt reports that she came to the hospital because she was trying to manage her depression. Pt is a good historian and has good insight about her mental health. Inpatient level of care indicated due to need for continued medication management, case management, and therapeutic intervention needed. Collateral:    Spoke with pt's wife. She reports that prior to admission,. Pt had been seeming increasingly overwhelmed. She reports that pt recently got diagnosed with bipolar disorder. Apparently they have been speaking and wife reports pt sounds \"much better. \"  She reports that pt shared that she has learned a lot since being in the hospital.  Wife reports that she does not know of any recent stressors, does not know of any past MH history.       LOS:  6  Expected LOS: 5-7    Insurance info/prescription coverage:  [address] [facility] PPO  Date of last family contact:  None at this time  Family requesting physician contact today:  no  Discharge plan:  Home or to crisis with outpatient follow up   Guns in the home:  no   Outpatient provider(s):  Therapist Gladys Callaway    Participating treatment team members: Lei Iyer, * (assigned SW intern), Jesus Sawant
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
## 2021-09-08 NOTE — GROUP NOTE
[facility] GROUP DOCUMENTATION INDIVIDUAL                                                                          Group Therapy Note    Date: 9/8/2021    Group Start Time: 1300  Group End Time: 1400  Group Topic: Education Group - Inpatient    SRM CARE MANAGEMENT    Stephanie Brock    [facility] GROUP DOCUMENTATION GROUP    Group Therapy Note Pt asked to to complete safety plan. Pt discussed various ways of coping with every day stressors. Pt's also discussed what they expect to learn from the group and how they can deal with situations when they are alone without others to help the calm down. Attendees: 12         Attendance: Attended    Patient's Goal:  Manage her level of anxiety    Interventions/techniques: Challenged, Informed, Validated, Promoted peer support, Provide feedback, Reinforced and Supported    Follows Directions: Followed directions    Interactions: Interacted appropriately    Mental Status: Calm    Behavior/appearance: Cooperative, Enthusiastic and Motivated    Goals Achieved: Able to engage in interactions, Able to listen to others, Able to give feedback to another, Able to receive feedback, Able to self-disclose, Discussed coping, Displayed empathy and Identified feelings      Additional Notes:  Pt want to discussed how she utilizes art and music as a way of keeping herself calm.      Louis Schwarz

## 2021-09-08 NOTE — GROUP NOTE
[facility] GROUP DOCUMENTATION INDIVIDUAL                                                                          Group Therapy Note    Date: 9/8/2021    Group Start Time: 0940  Group End Time: 4407  Group Topic: [employer] Meeting    SRM 2 BEHA HLTH ACUTE    Vonna Sol    IP 1150 [facility] GROUP DOCUMENTATION GROUP    Group Therapy Note    Attendees: 6       Attendance: {[facility] GROUP DOC ATTENDANCE:63240}    Patient's Goal:  ***    Interventions/techniques: {[facility] GROUP DOC INTERVENTIONS/TECHNIQUES:40099}    Follows Directions: {[facility] GROUP DOC FOLLOWS DIRECTION:60510}    Interactions: {C GROUP DOC INTERACTIONS:27985}    Mental Status: {[facility] GROUP DOC MENTAL STATUS:39445}    Behavior/appearance: {[facility] GROUP DOC BEHAVIOR/APPEARANCE:84565}    Goals Achieved: {[facility] GROUP DOC GOALS ACHIEVED:72194}      Additional Notes:  ***    Ashley Rich

## 2021-09-08 NOTE — BH NOTES
Patient alert and oriented. Denies suicidal or homicidal ideations. Took medications as ordered. Denies auditory or visual hallucinations. Patient expressed she would like to go home. Up to [facility] for breakfast, attended group, socializing with peers. No distress noted.

## 2021-09-09 VITALS
RESPIRATION RATE: 18 BRPM | DIASTOLIC BLOOD PRESSURE: 80 MMHG | BODY MASS INDEX: 32.14 KG/M2 | TEMPERATURE: 98.6 F | SYSTOLIC BLOOD PRESSURE: 116 MMHG | HEIGHT: 66 IN | WEIGHT: 200 LBS | OXYGEN SATURATION: 100 % | HEART RATE: 95 BPM

## 2021-09-09 PROCEDURE — 74011250637 HC RX REV CODE- 250/637: Performed by: FAMILY MEDICINE

## 2021-09-09 PROCEDURE — 74011250637 HC RX REV CODE- 250/637: Performed by: PSYCHIATRY & NEUROLOGY

## 2021-09-09 RX ORDER — TRAZODONE HYDROCHLORIDE 100 MG/1
100 TABLET ORAL
Qty: 30 TABLET | Refills: 0 | Status: SHIPPED | OUTPATIENT
Start: 2021-09-09

## 2021-09-09 RX ORDER — LAMOTRIGINE 50 MG/1
50 TABLET, EXTENDED RELEASE ORAL DAILY
Qty: 30 TABLET | Refills: 0 | Status: SHIPPED | OUTPATIENT
Start: 2021-09-09

## 2021-09-09 RX ORDER — ARIPIPRAZOLE 10 MG/1
10 TABLET ORAL DAILY
Qty: 30 TABLET | Refills: 0 | Status: SHIPPED | OUTPATIENT
Start: 2021-09-09

## 2021-09-09 RX ADMIN — METOPROLOL SUCCINATE 25 MG: 25 TABLET, EXTENDED RELEASE ORAL at 08:06

## 2021-09-09 RX ADMIN — LAMOTRIGINE 50 MG: 25 TABLET ORAL at 08:06

## 2021-09-09 RX ADMIN — LINACLOTIDE 290 MCG: 290 CAPSULE, GELATIN COATED ORAL at 08:21

## 2021-09-09 NOTE — GROUP NOTE
IP  GROUP DOCUMENTATION INDIVIDUAL                                                                          Group Therapy Note    Date: 9/9/2021    Group Start Time: 1115  Group End Time: 1200  Group Topic: Process Group - Inpatient    SRM CARE MANAGEMENT    Nina Lindsay    IP 1150 [facility] GROUP DOCUMENTATION GROUP    Group Therapy Note  In process group members shared goals/accomplishments they have already completed and are proud of and reflected on these accomplishments. Group members also shared goals they have started and want to complete and reflected on ways in which they can complete the goals. Group members also shared goals they have not yet started but hope to start and reflected on how to motivate themselves to accomplish these goals. Attendees: 10/12         Attendance: Attended    Patient's Goal:  Pt responded to the check in question and said her goal for the day is to [facility] anxiety. \"    Interventions/techniques: Informed, Validated and Supported    Follows Directions: Followed directions    Interactions: Interacted appropriately    Mental Status: Anxious, Congruent and Flat    Behavior/appearance: Withdrawn/quiet    Goals Achieved: Able to listen to others      Additional Notes:  Pt was withdrawn and chose not to speak in group but she responded to others. Pt was engaged with her peers.     Seven Walsh

## 2021-09-09 NOTE — GROUP NOTE
IP  GROUP DOCUMENTATION INDIVIDUAL                                                                          Group Therapy Note    Date: 9/8/2021    Group Start Time: 1815  Group End Time: 1900  Group Topic: Reflection/Relaxation    SRM 2  NON ACUTE    Aleja Mcclure    IP 1150 [facility] GROUP DOCUMENTATION GROUP    Group Therapy Note    Attendees: 10/12  Facilitated structured group to introduce Mindfulness and Meditation as positive way to cope and manage daily stressors. Introduced relaxation technique using Mandalas to practice relaxation in group       Attendance: Attended    Patient's Goal: STG: Attends activities and groups        Interventions/techniques: Art integration and Supported    Follows Directions: Followed directions    Interactions: Interacted appropriately    Mental Status: Calm and Flat    Behavior/appearance: Attentive and Cooperative    Goals Achieved: Able to engage in interactions and Able to listen to others      Additional Notes: Attended group and was receptive to intervention. Received material provided in group and was attentive during group discussion. Participated in relaxation technique of listening to instrumentals and coloring mandalas. Verbalized enjoyment of group. Attended entire session.    Sandip Rubio, CTRS

## 2021-09-09 NOTE — BH NOTES
Behavioral Health Transition Record to Provider    Patient Name: Olivia Gómez  YOB: 1990  Medical Record Number: [id number]  Date of Admission: 9/2/2021  Date of Discharge: 9/9/21    Attending Provider: Suzanne Burrell MD  Discharging Provider:   To contact this individual call [phone] and ask the  to page. If unavailable, ask to be transferred to [address] [place] [address] Provider on call. [facility] Provider will be available on call 24/7 and during holidays. Primary Care Provider: Ivan Zepeda MD    Allergies   Allergen Reactions    Latex Itching    Latex, Natural Rubber Itching       Reason for Admission: The patient was admitted for increased suicidal ideations with a plan to overdose on her medications. Admission Diagnosis: Major depression [F32.9]  Suicidal ideations [R45.851]    * No surgery found *    Results for orders placed or performed during the hospital encounter of 09/02/21   COVID-19 RAPID TEST   Result Value Ref Range    Specimen source Nasopharyngeal     SARS-COV-2   Result Value Ref Range    SARS-CoV-2 Not Detected Not Detected     CBC WITH AUTOMATED DIFF   Result Value Ref Range    WBC 6.0 3.6 - 11.0 K/uL    RBC 3.81 3.80 - 5.20 M/uL    HGB 11.5 11.5 - 16.0 g/dL    HCT 34.4 (L) 35.0 - 47.0 %    MCV 90.3 80.0 - 99.0 FL    MCH 30.2 26.0 - 34.0 PG    MCHC 33.4 30.0 - 36.5 g/dL    RDW 12.2 11.5 - 14.5 %    PLATELET 335 501 - 661 K/uL    MPV 9.3 8.9 - 12.9 FL    NRBC 0.0 0.0  WBC    ABSOLUTE NRBC 0.00 0.00 - 0.01 K/uL    NEUTROPHILS 72 32 - 75 %    LYMPHOCYTES 21 12 - 49 %    MONOCYTES 5 5 - 13 %    EOSINOPHILS 2 0 - 7 %    BASOPHILS 0 0 - 1 %    IMMATURE GRANULOCYTES 0 0 - 0.5 %    ABS. NEUTROPHILS 4.3 1.8 - 8.0 K/UL    ABS. LYMPHOCYTES 1.3 0.8 - 3.5 K/UL    ABS. MONOCYTES 0.3 0.0 - 1.0 K/UL    ABS. EOSINOPHILS 0.1 0.0 - 0.4 K/UL    ABS. BASOPHILS 0.0 0.0 - 0.1 K/UL    ABS. IMM.  GRANS. 0.0 0.00 - 0.04 K/UL    DF AUTOMATED     METABOLIC PANEL, COMPREHENSIVE   Result Value Ref Range    Sodium 141 136 - 145 mmol/L    Potassium 3.8 3.5 - 5.1 mmol/L    Chloride 109 (H) 97 - 108 mmol/L    CO2 26 21 - 32 mmol/L    Anion gap 6 5 - 15 mmol/L    Glucose 95 65 - 100 mg/dL    BUN 5 (L) 6 - 20 mg/dL    Creatinine 0.83 0.55 - 1.02 mg/dL    BUN/Creatinine ratio 6 (L) 12 - 20      GFR est AA >60 >60 ml/min/1.73m2    GFR est non-AA >60 >60 ml/min/1.73m2    Calcium 8.8 8.5 - 10.1 mg/dL    Bilirubin, total 0.3 0.2 - 1.0 mg/dL    AST (SGOT) 19 15 - 37 U/L    ALT (SGPT) 41 12 - 78 U/L    Alk. phosphatase 85 45 - 117 U/L    Protein, total 7.6 6.4 - 8.2 g/dL    Albumin 3.8 3.5 - 5.0 g/dL    Globulin 3.8 2.0 - 4.0 g/dL    A-G Ratio 1.0 (L) 1.1 - 2.2     HCG URINE, QL   Result Value Ref Range    HCG urine, QL Negative Negative     DRUG SCREEN, URINE   Result Value Ref Range    AMPHETAMINES Negative Negative      BARBITURATES Negative Negative      BENZODIAZEPINES Negative Negative      COCAINE Negative Negative      METHADONE Negative Negative      OPIATES Negative Negative      PCP(PHENCYCLIDINE) Negative Negative      THC (TH-CANNABINOL) Negative Negative      Drug screen comment        This test is a screen for drugs of abuse in a medical setting only (i.e., they are unconfirmed results and as such must not be used for non-medical purposes, e.g.,employment testing, legal testing). Due to its inherent nature, false positive (FP) and false negative (FN) results may be obtained. Therefore, if necessary for medical care, recommend confirmation of positive findings by GC/MS.    ETHYL ALCOHOL   Result Value Ref Range    ALCOHOL(ETHYL),SERUM <4 <10 mg/dL   URINALYSIS W/ REFLEX CULTURE    Specimen: Urine   Result Value Ref Range    Color Yellow/Straw      Appearance Clear Clear      Specific gravity 1.010 1.003 - 1.030      pH (UA) 6.0 5.0 - 8.0      Protein Negative Negative mg/dL    Glucose Negative Negative mg/dL    Ketone Negative Negative mg/dL    Bilirubin Negative Negative      Blood Negative Negative      Urobilinogen 0.1 0.1 - 1.0 EU/dL    Nitrites Negative Negative      Leukocyte Esterase Negative Negative      UA:UC IF INDICATED Culture not indicated by UA result Culture not indicated by UA result      WBC 0-4 0 - 4 /hpf    RBC 0-5 0 - 5 /hpf    Bacteria Negative Negative /hpf    Mucus Trace /lpf       Immunizations administered during this encounter: There is no immunization history on file for this patient. Screening for Metabolic Disorders for Patients on Antipsychotic Medications  (Data obtained from the EMR)    Estimated Body Mass Index  Estimated body mass index is 32.28 kg/m² as calculated from the following:    Height as of this encounter: 5' 6\" (1.676 m). Weight as of this encounter: 90.7 kg (200 lb). Vital Signs/Blood Pressure  Visit Vitals  /80   Pulse 95   Temp 98.6 °F (37 °C)   Resp 18   Ht 5' 6\" (1.676 m)   Wt 90.7 kg (200 lb)   LMP 08/21/2021   SpO2 100%   BMI 32.28 kg/m²       Blood Glucose/Hemoglobin A1c  Lab Results   Component Value Date/Time    Glucose 95 09/02/2021 02:30 PM       No results found for: HBA1C, [id number]     Lipid Panel  Lab Results   Component Value Date/Time    Cholesterol, total 161 01/06/2021 12:48 PM    HDL Cholesterol 30 (L) 01/06/2021 12:48 PM    LDL, calculated 81 01/06/2021 12:48 PM    Triglyceride 304 (H) 01/06/2021 12:48 PM        Discharge Diagnosis:  Major depression [F32.9]  Suicidal ideations [R45.851]    Discharge Plan: The patient will be discharging home with her wife. She will follow-up with her existing providers from [facility], and was connected with Cristopher Mello[facility]. Discharge Medication List and Instructions:   Current Discharge Medication List      START taking these medications    Details   traZODone (DESYREL) 100 mg tablet Take 1 Tablet by mouth nightly.   Qty: 30 Tablet, Refills: 0  Start date: 9/9/2021         CONTINUE these medications which have CHANGED    Details ARIPiprazole (ABILIFY) 10 mg tablet Take 1 Tablet by mouth daily. Qty: 30 Tablet, Refills: 0  Start date: 9/9/2021      lamoTRIgine (LaMICtal XR) 50 mg tr24 ER tablet Take 1 Tablet by mouth daily. Qty: 30 Tablet, Refills: 0  Start date: 9/9/2021         CONTINUE these medications which have NOT CHANGED    Details   linaCLOtide (Linzess) 290 mcg cap capsule Take  by mouth Daily (before breakfast). metoprolol succinate (TOPROL-XL) 100 mg tablet Take 100 mg by mouth daily. Last dose  Mid june      mometasone (ELOCON) 0.1 % topical cream Use sparingly, apply to external ear twice daily x 1 week, then once every few weeks- months PRN itching  Qty: 15 g, Refills: 0      rimegepant (Nurtec ODT) 75 mg disintegrating tablet Take 75 mg by mouth once as needed for Migraine. AS NEEDED FOR MIGRAINES      amitriptyline (ELAVIL) 100 mg tablet Take 100 mg by mouth nightly. rizatriptan (MAXALT) 10 mg tablet Take 10 mg by mouth once as needed for Migraine. May repeat in 2 hours if needed             Unresulted Labs (24h ago, onward)    None        To obtain results of studies pending at discharge, please contact [phone]    Follow-up Information     Follow up With Specialties Details [address]   On 9/29/2021 You have an appointment with psychiatrist Jono Toney at 9:30am via telehealth. [phone], [place], [address]   On 9/16/2021 You have an appointment with therapist Alfonza Spatz at [facility].  [phone]  [facility] [facility], [place], [address]    Nikhil Auguste MD Internal Medicine   [facility]  [address]  [facility] [phone]            Advanced Directive:   Does the patient have an appointed surrogate decision maker? No  Does the patient have a Medical Advance Directive? No  Does the patient have a Psychiatric Advance Directive?  No  If the patient does not have a surrogate or Medical Advance Directive AND Psychiatric Advance Directive, the patient was offered information on these advance directives Patient declined to complete    Patient Instructions: Please continue all medications until otherwise directed by physician. Tobacco Cessation Discharge Plan:   Is the patient a smoker and needs referral for smoking cessation? No  Patient referred to the following for smoking cessation with an appointment? No     Patient was offered medication to assist with smoking cessation at discharge? No  Was education for smoking cessation added to the discharge instructions? No    Alcohol/Substance Abuse Discharge Plan:   Does the patient have a history of substance/alcohol abuse and requires a referral for treatment? No  Patient referred to the following for substance/alcohol abuse treatment with an appointment? Not applicable  Patient was offered medication to assist with alcohol cessation at discharge? Not applicable  Was education for substance/alcohol abuse added to discharge instructions?  Not applicable    Patient discharged to Home; discussed with patient/caregiver

## 2021-09-09 NOTE — SUICIDE SAFETY PLAN
SAFETY PLAN    A suicide Safety Plan is a document that supports someone when they are having thoughts of suicide. Warning Signs that indicate a suicidal crisis may be developing: What (situations, thoughts, feelings, body sensations, behaviors, etc.) do you experience that lets you know you are beginning to think about suicide? 1. Money problems  2. Overworked feeling  3. Heart rate increases    Internal Coping Strategies:  What things can I do (relaxation techniques, hobbies, physical activities, etc.) to take my mind off my problems without contacting another person? 1. Painting   2. Crocheting   3. Music    People and social settings that provide distraction: Who can I call or where can I go to distract me? 1. Name: Brenden Humphries (wife)  Phone: [phone]  2. Name: Declan Villatoro  Phone: [phone]   3. Place: [employer]            4. Place: For a drive    People whom I can ask for help: Who can I call when I need help - for example, friends, family, clergy, someone else? 1. Name: Alaina Pate                 Phone: [phone]  2. Name: Declan Villatoro  Phone: [phone]  3. Name:   Phone:     Professionals or [address] [place] [address] I can contact during a crisis: Who can I call for help - for example, my doctor, my psychiatrist, my psychologist, a mental health provider, a suicide hotline? 1. Clinician Name: [facility]   Phone: [phone]      Clinician Pager or Emergency Contact #: [phone]    1. Clinician Name: Antonio Ordonez   Phone: [phone]      Clinician Pager or Emergency Contact #: [phone]    9. Suicide Prevention Lifeline: 2-502-820-TALK (5661)    4. [address] [place] [address] Emergency Services -  for example, [facility] Hot[facility]: [place] [address]      [facility]: [facility] [address], [place], [place]       Emergency Services Phone: [phone]    Making the environment safe:  How can I make my environment (house/apartment/living space) safer? For example, can I remove guns, medications, and other items? 1. Remove medications I should no longer have  2.

## 2021-09-09 NOTE — BH NOTES
Patient polite, non-complaining when morning med (before breakfast) was delayed waiting on refill. Socialized with peers, walked laps on the unit with same, denies HI/Si and/or A/V hallucinations. Discharge today is ordered. Patient discharged, ambulatory @ [phone], to spouse waiting in the hospital pickup area.

## 2021-09-09 NOTE — GROUP NOTE
JAG  GROUP DOCUMENTATION INDIVIDUAL                                                                          Group Therapy Note    Date: 9/9/2021    Group Start Time: 5044  Group End Time: 2848  Group Topic: [employer]    SRM 2 BH NON ACUTE    Priya Singletary    IP [facility] GROUP DOCUMENTATION GROUP    Group Therapy Note    Attendees:10         Attendance: Attended    Patient's Goal:  To continue to manage her anxiety    Interventions/techniques: Follows Directions:  Followed directions    Interactions: Interacted appropriately    Mental Status: Other Homesick and eager    Behavior/appearance: Cooperative    Goals Achieved: Able to engage in interactions, Able to listen to others and Identified feelings      Additional Notes:      Xander Solomon

## 2021-09-09 NOTE — DISCHARGE INSTRUCTIONS
Patient Education        Learning About Depression Screening  What is depression screening? Depression screening is a way to see if you have depression symptoms. It may be done by a doctor or counselor. It's often part of a routine checkup. That's because your mental health is just as important as your physical health. Depression is a medical illness that affects how you feel, think, and act. You may:  · Have less energy. · Lose interest in your daily activities. · Feel sad and grouchy for a long time. Depression is very common. It affects men and women of all ages. Many things can trigger depression. Some people become depressed after they have a stroke or find out they have a major illness like cancer or heart disease. The death of a loved one, a breakup, or changes in the natural brain chemicals may lead to depression. It can run in families. Most experts believe that a combination of inherited genes and stressful life events can cause it. What happens during screening? You may be asked to fill out a form about your depression symptoms. You and the doctor will discuss your answers. The doctor may ask you more questions to learn more about how you think, act, and feel. What happens after screening? If you have signs of depression, your doctor will talk to you about your options. Doctors usually treat depression with medicines or counseling. Often, combining the two works best. Many people don't get help because they think that they'll get over the depression on their own. But people with depression may not get better unless they get treatment. Many people feel embarrassed or ashamed about having depression. But it isn't a sign of personal weakness. It's not a character flaw. A person who is depressed is not \"crazy. \" Depression is caused by changes in the brain. A serious symptom of depression is thinking about death or suicide.  If you or someone you care about talks about this or about feeling hopeless, get help right away. It's important to know that depression can be treated. The first step toward feeling better is often just seeing that the problem exists. Where can you learn more? Go to http://www.gray.com/  Enter T185 in the search box to learn more about \"Learning About Depression Screening. \"  Current as of: September 23, 2020               Content Version: 12.8  © 2006-2021 [employer]. Care instructions adapted under license by [employer] (which disclaims liability or warranty for this information). If you have questions about a medical condition or this instruction, always ask your healthcare professional. [place] [address] any warranty or liability for your use of this information. Patient Education        Recovering From Depression: Care Instructions  Your Care Instructions     Taking good care of yourself is important as you recover from depression. In time, your symptoms will fade as your treatment takes hold. Do not give up. Instead, focus your energy on getting better. Your mood will improve. It just takes some time. Focus on things that can help you feel better, such as being with friends and family, eating well, and getting enough rest. But take things slowly. Do not do too much too soon. You will begin to feel better gradually. Follow-up care is a key part of your treatment and safety. Be sure to make and go to all appointments, and call your doctor if you are having problems. It's also a good idea to know your test results and keep a list of the medicines you take. How can you care for yourself at home? Be realistic  · If you have a large task to do, break it up into smaller steps you can handle, and just do what you can. · You may want to put off important decisions until your depression has lifted.  If you have plans that will have a major impact on your life, such as marriage, divorce, or a job change, try to wait a bit. Talk it over with friends and loved ones who can help you look at the overall picture first.  · Reaching out to people for help is important. Do not isolate yourself. Let your family and friends help you. Find someone you can trust and confide in, and talk to that person. · Be patient, and be kind to yourself. Remember that depression is not your fault and is not something you can overcome with willpower alone. Treatment is important for depression, just like for any other illness. Feeling better takes time, and your mood will improve little by little. Stay active  · Stay busy and get outside. Take a walk, or try some other light exercise. · Talk with your doctor about an exercise program. Exercise can help with mild depression. · Go to a movie or concert. Take part in a [employer] activity or other social gathering. Go to a [employer] game. · Ask a friend to have dinner with you. Take care of yourself  · Eat a balanced diet with plenty of fresh fruits and vegetables, whole grains, and lean protein. If you have lost your appetite, eat small snacks rather than large meals. · Avoid using illegal drugs or marijuana and drinking alcohol. Do not take medicines that have not been prescribed for you. They may interfere with medicines you may be taking for depression, or they may make your depression worse. · Take your medicines exactly as they are prescribed. You may start to feel better within 1 to 3 weeks of taking antidepressant medicine. But it can take as many as 6 to 8 weeks to see more improvement. If you have questions or concerns about your medicines, or if you do not notice any improvement by 3 weeks, talk to your doctor. · Continue to take your medicine after your symptoms improve. Taking your medicine for at least 6 months after you feel better can help keep you from getting depressed again.  If this isn't the first time you have been depressed, your doctor may recommend you to take medicine even longer. · If you have any side effects from your medicine, tell your doctor. Many side effects are mild and will go away on their own after you have been taking the medicine for a few weeks. Some may last longer. Talk to your doctor if side effects are bothering you too much. You might be able to try a different medicine. · Continue counseling. It may help prevent depression from returning, especially if you've had multiple episodes of depression. Talk with your counselor if you are having a hard time attending your sessions or you think the sessions aren't working. Don't just stop going. · Get enough sleep. Talk to your doctor if you are having problems sleeping. · Avoid sleeping pills unless they are prescribed by the doctor treating your depression. Sleeping pills may make you groggy during the day, and they may interact with other medicine you are taking. · If you have any other illnesses, such as diabetes, heart disease, or high blood pressure, make sure to continue with your treatment. Tell your doctor about all of the medicines you take, including those with or without a prescription. · If you or someone you know talks about suicide, self-harm, or feeling hopeless, get help right away. Call the [address] [place] [address] at 1-800-273-talk (6-305.167.1774) or text HOME to 177999 to access the Crisis Text Line. Consider saving these numbers in your phone. When should you call for help? Call 066 anytime you think you may need emergency care. For example, call if:    · You feel like hurting yourself or someone else.     · Someone you know has depression and is about to attempt or is attempting suicide. Call your doctor now or seek immediate medical care if:    · You hear voices.     · Someone you know has depression and:  ? Starts to give away his or her possessions. ? Uses illegal drugs or drinks alcohol heavily.   ? Talks or writes about death, including writing suicide notes or talking about guns, knives, or pills. ? Starts to spend a lot of time alone. ? Acts very aggressively or suddenly appears calm. Watch closely for changes in your health, and be sure to contact your doctor if:    · You do not get better as expected. Where can you learn more? Go to http://www.gray.com/  Enter N529 in the search box to learn more about \"Recovering From Depression: Care Instructions. \"  Current as of: September 23, 2020               Content Version: 12.8  © 0057-9247 [employer]. Care instructions adapted under license by [employer] (which disclaims liability or warranty for this information). If you have questions about a medical condition or this instruction, always ask your healthcare professional. [address] any warranty or liability for your use of this information.

## 2021-09-09 NOTE — GROUP NOTE
IP  GROUP DOCUMENTATION INDIVIDUAL                                                                          Group Therapy Note    Date: 9/9/2021    Group Start Time: 8208  Group End Time: 8872  Group Topic: Recreational/Music Therapy    SRM 2 BH NON ACUTE    Henok Dolomite    IP [facility] GROUP DOCUMENTATION GROUP    Group Therapy Note    Facilitated leisure skills group to reinforce positive coping through music, social interaction, group participation and arts/crafts    Attendees: 12/12         Attendance: Attended    Patient's Goal:  Attend group daily     Interventions/techniques: Art integration and Supported    Follows Directions: Followed directions    Interactions: Interacted appropriately    Mental Status: Happy    Behavior/appearance: Cooperative and Motivated    Goals Achieved: Able to engage in interactions and Able to listen to others      Additional Notes:  Receptive to listening to music and a song she selected while working on leisure task.  Interacted with peers and staff    Bisi Wynn

## 2021-09-09 NOTE — PROGRESS NOTES
Denies suicidal ideation or thoughts of self harm. Visible in dayroom participating in unit activities. Compliant with medication.

## 2021-09-09 NOTE — BH NOTES
DISCHARGE SUMMARY    NAME:Arianna Gonzalez  : 1990  MRN: [id number]    The patient Shan Sieving exhibits the ability to control behavior in a less restrictive environment. Patient's level of functioning is improving. No assaultive/destructive behavior has been observed for the past 24 hours. No suicidal/homicidal threat or behavior has been observed for the past 24 hours. There is no evidence of serious medication side effects. Patient has not been in physical or protective restraints for at least the past 24 hours. If weapons involved, how are they secured? The patient denied having access to any firearms, which was confirmed by her wife. Is patient aware of and in agreement with discharge plan? Yes    Arrangements for medication:  Prescriptions given to patient, given a weeks supply or 30 day supply. Copy of discharge instructions to provider?:  Yes    Arrangements for transportation home:  The patient's wife is going to pick her up. Keep all follow up appointments as scheduled, continue to take prescribed medications per physician instructions.   Mental health crisis number:  741 or your local mental health crisis line number at [address] [facility] Emergency WARM LINE      3-557-403-MHAV (0614)      M-F: 9am to 9pm      Sat & Sun: 5pm  9pm  National suicide prevention lines:                             3-470-ZDBHVAU (7-024-766-657-350-3041)       5-624-233-TALK ([phone])    Crisis Text Line:  Text HOME to 855832
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
## 2021-09-09 NOTE — BH NOTES
Patient visible in dayroom participating in unit activities and used phone. Denies suicidal ideation or thoughts of self harm. Denies homicidal ideation. Denies auditory or visual hallucinations. Medication compliant. Will continue to monitor for safety.

## 2021-09-13 NOTE — PROGRESS NOTES
Doing well and she looks fantastic. D                     CARDIOLOGY OFFICE NOTE    James Boyce MD, [address], [place], [address] [facility]  Phone [phone]; Fax [phone]  Mobile [phone]   Voice Mail [phone]    LAST OFFICE VISIT : Visit date not found  Karen Méndez MD       ATTENTION:   This medical record was transcribed using an electronic medical records/speech recognition system. Although proofread, it may and can contain electronic, spelling and other errors. Corrections may be executed at a later time. Please feel free to contact us for any clarifications as needed. ICD-10-CM ICD-9-CM   1. Tachycardia  R00.0 785.0   2. Severe obesity ([address]  E66.01 278.01            Dionna Sow is a 27 y.o. female with  referred for asymptomatic tachycardia. Cardiac risk factors: family history  I have personally obtained the history from the patient. HISTORY OF PRESENTING ILLNESS      Ms. Jeni Oliveira is doing well and looks fantastic. She is lost weight. Her heart rate is better even at home on the 100 mg of metoprolol and is here for refill. She is tolerating metoprolol with no side effects       ACTIVE PROBLEM LIST     Patient Active Problem List    Diagnosis Date Noted    Major depression 09/02/2021    Suicidal ideations 09/02/2021    Severe obesity ([address] 11/20/2020           PAST MEDICAL HISTORY     Past Medical History:   Diagnosis Date    Bipolar 1 disorder ([address]     Headache     IBS (irritable bowel syndrome)     Tachycardia            PAST SURGICAL HISTORY     No past surgical history on file.        ALLERGIES     Allergies   Allergen Reactions    Latex Itching    Latex, Natural Rubber Itching          FAMILY HISTORY     Family History   Problem Relation Age of Onset    Diabetes Father     Heart Disease Father     negative for cardiac disease       SOCIAL HISTORY     Social History     Socioeconomic History    Marital status:  Spouse name: Not on file    Number of children: Not on file    Years of education: Not on file    Highest education level: Not on file   Tobacco Use    Smoking status: Never Smoker    Smokeless tobacco: Never Used   Vaping Use    Vaping Use: Never used   Substance and Sexual Activity    Alcohol use: Yes    Drug use: No    Sexual activity: Yes     Partners: Female     Birth control/protection: None   Social History Narrative    ** Merged History Encounter **          Social Determinants of Health     Financial Resource Strain:     Difficulty of Paying Living Expenses:    Food Insecurity:     Worried About Running Out of Food in the Last Year:     [address] in the Last Year:    Transportation Needs:     Lack of Transportation (Medical):  Lack of Transportation (Non-Medical):    Physical Activity:     Days of Exercise per Week:     Minutes of Exercise per Session:    Stress:     Feeling of Stress :    Social Connections:     Frequency of Communication with Friends and Family:     Frequency of Social Gatherings with Friends and Family:     Attends Rastafarian Services:     Active Member of Clubs or Organizations:     Attends Club or Organization Meetings:     Marital Status:          MEDICATIONS     Current Outpatient Medications   Medication Sig    ARIPiprazole (ABILIFY) 10 mg tablet Take 1 Tablet by mouth daily.  lamoTRIgine (LaMICtal XR) 50 mg tr24 ER tablet Take 1 Tablet by mouth daily.  traZODone (DESYREL) 100 mg tablet Take 1 Tablet by mouth nightly.  metoprolol succinate (TOPROL-XL) 100 mg tablet Take 100 mg by mouth daily. Last dose  Mid june     mometasone (ELOCON) 0.1 % topical cream Use sparingly, apply to external ear twice daily x 1 week, then once every few weeks- months PRN itching    rimegepant (Nurtec ODT) 75 mg disintegrating tablet Take 75 mg by mouth once as needed for Migraine.  AS NEEDED FOR MIGRAINES    linaCLOtide (Linzess) 290 mcg cap capsule Take by mouth Daily (before breakfast).  rizatriptan (MAXALT) 10 mg tablet Take 10 mg by mouth once as needed for Migraine. May repeat in 2 hours if needed    amitriptyline (ELAVIL) 100 mg tablet Take 100 mg by mouth nightly. (Patient not taking: Reported on 9/2/2021)     No current facility-administered medications for this visit. I have reviewed the nurses notes, vitals, problem list, allergy list, medical history, family, social history and medications. REVIEW OF SYMPTOMS   Pertinent positives as per HPI  General: Pt denies excessive weight gain or loss. Pt is able to conduct ADL's  HEENT: Denies blurred vision, headaches, hearing loss, epistaxis and difficulty swallowing. Respiratory: Denies cough, congestion, shortness of breath, NEVAREZ, wheezing or stridor. Cardiovascular: Denies precordial pain, palpitations, edema or PND  Gastrointestinal: Denies poor appetite, indigestion, abdominal pain or blood in stool  Genitourinary: Denies hematuria, dysuria, increased urinary frequency  Musculoskeletal: Denies joint pain or swelling from muscles or joints  Neurologic: Denies tremor, paresthesias, headache, or sensory motor disturbance  Psychiatric: Denies confusion, insomnia, depression  Integumentray: Denies rash, itching or ulcers. Hematologic: Denies easy bruising, bleeding     PHYSICAL EXAMINATION      Vitals:    09/14/21 1357   BP: 118/80   Pulse: 88   SpO2: 99%   Weight: 215 lb (97.5 kg)   Height: 5' 6\" (1.676 m)     General: Well developed, in no acute distress. HEENT: No jaundice, oral mucosa moist, no oral ulcers  Neck: Supple, no stiffness, no lymphadenopathy, supple  Heart:   Elevated heart rate  Respiratory: Clear bilaterally x 4, no wheezing or rales  Extremities:  No edema, normal cap refill, no cyanosis. Musculoskeletal: No clubbing, no deformities  Neuro: A&Ox3, speech clear, gait stable, cooperative, no focal neurologic deficits  Skin: Skin color is normal. No rashes or lesions.  Non diaphoretic, moist.        EKG: NSR with  Non specific ST-T changes     DIAGNOSTIC DATA     1. Echo   12/4/20- EF 55 - 60%, Mitral valve thickening. Myxomatous mitral valve disease. 2. Lipids   1/6/21-, HDL 30, LDL 81,      3. LE Duplex   1/11/21-No evidence of deep vein thrombosis or venous obstruction within the lower extremities bilaterally    4. Holter  48 hour- 12/4/20- 1.5% SVT min HR 75, max 152         LABORATORY DATA            Lab Results   Component Value Date/Time    WBC 6.0 09/02/2021 02:30 PM    HGB 11.5 09/02/2021 02:30 PM    HCT 34.4 (L) 09/02/2021 02:30 PM    PLATELET 020 27/59/2390 02:30 PM    MCV 90.3 09/02/2021 02:30 PM      Lab Results   Component Value Date/Time    Sodium 141 09/02/2021 02:30 PM    Potassium 3.8 09/02/2021 02:30 PM    Chloride 109 (H) 09/02/2021 02:30 PM    CO2 26 09/02/2021 02:30 PM    Anion gap 6 09/02/2021 02:30 PM    Glucose 95 09/02/2021 02:30 PM    BUN 5 (L) 09/02/2021 02:30 PM    Creatinine 0.83 09/02/2021 02:30 PM    BUN/Creatinine ratio 6 (L) 09/02/2021 02:30 PM    GFR est AA >60 09/02/2021 02:30 PM    GFR est non-AA >60 09/02/2021 02:30 PM    Calcium 8.8 09/02/2021 02:30 PM    Bilirubin, total 0.3 09/02/2021 02:30 PM    Alk. phosphatase 85 09/02/2021 02:30 PM    Protein, total 7.6 09/02/2021 02:30 PM    Albumin 3.8 09/02/2021 02:30 PM    Globulin 3.8 09/02/2021 02:30 PM    A-G Ratio 1.0 (L) 09/02/2021 02:30 PM    ALT (SGPT) 41 09/02/2021 02:30 PM           ASSESSMENT/RECOMMENDATIONS:.      1.  Tachycardia  -Has improved significantly on the metoprolol at 100 mg  -No side effects for metoprolol  2. Shortness of breath   -No complaints of shortness of breath  -EF has been normal in the past and venous Dopplers of her legs showed no DVTs. 3.  Screening cholesterol  -LDL is at goal in the 80s. HDL is low needs exercise in order to raise this    Follow-up with me in 1 year    No orders of the defined types were placed in this encounter.       We discussed the expected course, resolution and complications of the diagnosis(es) in detail. Medication risks, benefits, costs, interactions, and alternatives were discussed as indicated. I advised him to contact the office if his condition worsens, changes or fails to improve as anticipated. He expressed understanding with the diagnosis(es) and plan          Follow-up and Dispositions  ·   Return in about 6 months (around 3/14/2022). I have discussed the diagnosis with  [facility] and the intended plan as seen in the above orders. Questions were answered concerning future plans. I have discussed medication side effects and warnings with the patient as well. Thank you,  Chai Lauren MD for involving me in the care of  [facility]. Please do not hesitate to contact me for further questions/concerns. James Palencia MD, Count includes the [facility] [address] [place] [address], [address] [place] [address] [address]      [phone] / [phone] Fax

## 2021-09-14 ENCOUNTER — OFFICE VISIT (OUTPATIENT)
Dept: CARDIOLOGY CLINIC | Age: 31
End: 2021-09-14
Payer: COMMERCIAL

## 2021-09-14 VITALS
SYSTOLIC BLOOD PRESSURE: 118 MMHG | WEIGHT: 215 LBS | BODY MASS INDEX: 34.55 KG/M2 | DIASTOLIC BLOOD PRESSURE: 80 MMHG | OXYGEN SATURATION: 99 % | HEART RATE: 88 BPM | HEIGHT: 66 IN

## 2021-09-14 DIAGNOSIS — R00.0 TACHYCARDIA: Primary | ICD-10-CM

## 2021-09-14 DIAGNOSIS — E66.01 SEVERE OBESITY (HCC): ICD-10-CM

## 2021-09-14 PROCEDURE — 99213 OFFICE O/P EST LOW 20 MIN: CPT | Performed by: SPECIALIST

## 2021-09-14 RX ORDER — METOPROLOL SUCCINATE 100 MG/1
100 TABLET, EXTENDED RELEASE ORAL DAILY
Qty: 90 TABLET | Refills: 5 | Status: SHIPPED | OUTPATIENT
Start: 2021-09-14
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
## 2022-03-18 PROBLEM — R45.851 SUICIDAL IDEATIONS: Status: ACTIVE | Noted: 2021-09-02

## 2022-03-19 PROBLEM — F32.9 MAJOR DEPRESSION: Status: ACTIVE | Noted: 2021-09-02

## 2022-03-20 PROBLEM — E66.01 SEVERE OBESITY (HCC): Status: ACTIVE | Noted: 2020-11-20

## 2023-03-13 ENCOUNTER — APPOINTMENT (OUTPATIENT)
Dept: CT IMAGING | Age: 33
End: 2023-03-13
Attending: STUDENT IN AN ORGANIZED HEALTH CARE EDUCATION/TRAINING PROGRAM
Payer: COMMERCIAL

## 2023-03-13 ENCOUNTER — HOSPITAL ENCOUNTER (EMERGENCY)
Age: 33
Discharge: HOME | End: 2023-03-13
Attending: EMERGENCY MEDICINE
Payer: COMMERCIAL

## 2023-03-13 ENCOUNTER — APPOINTMENT (OUTPATIENT)
Dept: GENERAL RADIOLOGY | Age: 33
End: 2023-03-13
Attending: EMERGENCY MEDICINE
Payer: COMMERCIAL

## 2023-03-13 VITALS
OXYGEN SATURATION: 99 % | HEART RATE: 85 BPM | SYSTOLIC BLOOD PRESSURE: 117 MMHG | DIASTOLIC BLOOD PRESSURE: 79 MMHG | WEIGHT: 198.41 LBS | TEMPERATURE: 98.4 F | RESPIRATION RATE: 16 BRPM | BODY MASS INDEX: 31.89 KG/M2 | HEIGHT: 66 IN

## 2023-03-13 DIAGNOSIS — R07.9 CHEST PAIN, UNSPECIFIED TYPE: Primary | ICD-10-CM

## 2023-03-13 DIAGNOSIS — D50.0 IRON DEFICIENCY ANEMIA DUE TO CHRONIC BLOOD LOSS: ICD-10-CM

## 2023-03-13 LAB
ALBUMIN SERPL-MCNC: 4.1 G/DL (ref 3.5–5)
ALBUMIN/GLOB SERPL: 1.1 (ref 1.1–2.2)
ALP SERPL-CCNC: 96 U/L (ref 45–117)
ALT SERPL-CCNC: 21 U/L (ref 12–78)
ANION GAP SERPL CALC-SCNC: 11 MMOL/L (ref 5–15)
AST SERPL-CCNC: 16 U/L (ref 15–37)
ATRIAL RATE: 87 BPM
BASOPHILS # BLD: 0 K/UL (ref 0–0.1)
BASOPHILS NFR BLD: 0 % (ref 0–1)
BILIRUB SERPL-MCNC: 0.2 MG/DL (ref 0.2–1)
BUN SERPL-MCNC: 12 MG/DL (ref 6–20)
BUN/CREAT SERPL: 13 (ref 12–20)
CALCIUM SERPL-MCNC: 8.9 MG/DL (ref 8.5–10.1)
CALCULATED P AXIS, ECG09: 42 DEGREES
CALCULATED R AXIS, ECG10: 11 DEGREES
CALCULATED T AXIS, ECG11: -14 DEGREES
CHLORIDE SERPL-SCNC: 103 MMOL/L (ref 97–108)
CO2 SERPL-SCNC: 25 MMOL/L (ref 21–32)
CREAT SERPL-MCNC: 0.91 MG/DL (ref 0.55–1.02)
D DIMER PPP FEU-MCNC: 1.37 MG/L FEU (ref 0–0.65)
DIAGNOSIS, 93000: NORMAL
DIFFERENTIAL METHOD BLD: ABNORMAL
EOSINOPHIL # BLD: 0.1 K/UL (ref 0–0.4)
EOSINOPHIL NFR BLD: 2 % (ref 0–7)
ERYTHROCYTE [DISTWIDTH] IN BLOOD BY AUTOMATED COUNT: 13.5 % (ref 11.5–14.5)
GLOBULIN SER CALC-MCNC: 3.7 G/DL (ref 2–4)
GLUCOSE SERPL-MCNC: 99 MG/DL (ref 65–100)
HCG UR QL: NEGATIVE
HCT VFR BLD AUTO: 29.5 % (ref 35–47)
HGB BLD-MCNC: 9.2 G/DL (ref 11.5–16)
IMM GRANULOCYTES # BLD AUTO: 0 K/UL (ref 0–0.04)
IMM GRANULOCYTES NFR BLD AUTO: 0 % (ref 0–0.5)
LYMPHOCYTES # BLD: 1 K/UL (ref 0.8–3.5)
LYMPHOCYTES NFR BLD: 20 % (ref 12–49)
MCH RBC QN AUTO: 25.3 PG (ref 26–34)
MCHC RBC AUTO-ENTMCNC: 31.2 G/DL (ref 30–36.5)
MCV RBC AUTO: 81 FL (ref 80–99)
MONOCYTES # BLD: 0.5 K/UL (ref 0–1)
MONOCYTES NFR BLD: 9 % (ref 5–13)
NEUTS SEG # BLD: 3.5 K/UL (ref 1.8–8)
NEUTS SEG NFR BLD: 69 % (ref 32–75)
NRBC # BLD: 0 K/UL (ref 0–0.01)
NRBC BLD-RTO: 0 PER 100 WBC
P-R INTERVAL, ECG05: 172 MS
PLATELET # BLD AUTO: 277 K/UL (ref 150–400)
PMV BLD AUTO: 9.4 FL (ref 8.9–12.9)
POTASSIUM SERPL-SCNC: 3.7 MMOL/L (ref 3.5–5.1)
PROT SERPL-MCNC: 7.8 G/DL (ref 6.4–8.2)
Q-T INTERVAL, ECG07: 356 MS
QRS DURATION, ECG06: 80 MS
QTC CALCULATION (BEZET), ECG08: 428 MS
RBC # BLD AUTO: 3.64 M/UL (ref 3.8–5.2)
SODIUM SERPL-SCNC: 139 MMOL/L (ref 136–145)
TROPONIN I SERPL HS-MCNC: <4 NG/L (ref 0–51)
VENTRICULAR RATE, ECG03: 87 BPM
WBC # BLD AUTO: 5.1 K/UL (ref 3.6–11)

## 2023-03-13 PROCEDURE — 93005 ELECTROCARDIOGRAM TRACING: CPT

## 2023-03-13 PROCEDURE — 96374 THER/PROPH/DIAG INJ IV PUSH: CPT

## 2023-03-13 PROCEDURE — 74011250636 HC RX REV CODE- 250/636: Performed by: STUDENT IN AN ORGANIZED HEALTH CARE EDUCATION/TRAINING PROGRAM

## 2023-03-13 PROCEDURE — 85025 COMPLETE CBC W/AUTO DIFF WBC: CPT

## 2023-03-13 PROCEDURE — 84484 ASSAY OF TROPONIN QUANT: CPT

## 2023-03-13 PROCEDURE — 74011000636 HC RX REV CODE- 636: Performed by: EMERGENCY MEDICINE

## 2023-03-13 PROCEDURE — 80053 COMPREHEN METABOLIC PANEL: CPT

## 2023-03-13 PROCEDURE — 85379 FIBRIN DEGRADATION QUANT: CPT

## 2023-03-13 PROCEDURE — 71275 CT ANGIOGRAPHY CHEST: CPT

## 2023-03-13 PROCEDURE — 99285 EMERGENCY DEPT VISIT HI MDM: CPT

## 2023-03-13 PROCEDURE — 36415 COLL VENOUS BLD VENIPUNCTURE: CPT

## 2023-03-13 PROCEDURE — 71046 X-RAY EXAM CHEST 2 VIEWS: CPT

## 2023-03-13 PROCEDURE — 81025 URINE PREGNANCY TEST: CPT

## 2023-03-13 PROCEDURE — 96361 HYDRATE IV INFUSION ADD-ON: CPT

## 2023-03-13 RX ORDER — ONDANSETRON 2 MG/ML
4 INJECTION INTRAMUSCULAR; INTRAVENOUS ONCE
Status: COMPLETED | OUTPATIENT
Start: 2023-03-13 | End: 2023-03-13

## 2023-03-13 RX ORDER — ONDANSETRON 4 MG/1
4 TABLET, ORALLY DISINTEGRATING ORAL
Qty: 20 TABLET | Refills: 0 | Status: SHIPPED | OUTPATIENT
Start: 2023-03-13

## 2023-03-13 RX ORDER — LANOLIN ALCOHOL/MO/W.PET/CERES
325 CREAM (GRAM) TOPICAL
Qty: 30 TABLET | Refills: 0 | Status: SHIPPED | OUTPATIENT
Start: 2023-03-13

## 2023-03-13 RX ADMIN — SODIUM CHLORIDE 1000 ML: 9 INJECTION, SOLUTION INTRAVENOUS at 13:21

## 2023-03-13 RX ADMIN — ONDANSETRON HYDROCHLORIDE 4 MG: 2 SOLUTION INTRAMUSCULAR; INTRAVENOUS at 13:21

## 2023-03-13 RX ADMIN — IOPAMIDOL 100 ML: 755 INJECTION, SOLUTION INTRAVENOUS at 14:10

## 2023-03-13 NOTE — ED PROVIDER NOTES
27-year-old female with history of bipolar 1 disorder, IBS and tachycardia presents to ED with 2 days of chest pain. Patient reports that 2 days ago she started having some substernal, pressure-like chest pain that was consistent in nature. She also noted associated nausea and vomiting. She notes that yesterday she felt better so she did not think much of it but this morning when she arrived at work she started having chest pain again with associated lightheadedness and nausea. She has not vomited today. Denies any associated shortness of breath or palpitations. She notes that she had a history of this before and had a stress test which was negative at a cardiologist but was diagnosed with tachycardia and placed on a unknown medication. She reports that she changed her insurance and has not followed up since and has not been on this medication for several months. She denies any associated fevers, chills, cough, diarrhea. The history is provided by the patient. Chest Pain (Angina)   Associated symptoms include nausea. Pertinent negatives include no dizziness, no fever, no headaches, no shortness of breath and no vomiting. Past Medical History:   Diagnosis Date    Bipolar 1 disorder ([address]     Headache     IBS (irritable bowel syndrome)     Tachycardia        History reviewed. No pertinent surgical history.       Family History:   Problem Relation Age of Onset    Diabetes Father     Heart Disease Father        Social History     Socioeconomic History    Marital status:      Spouse name: Not on file    Number of children: Not on file    Years of education: Not on file    Highest education level: Not on file   Occupational History    Not on file   Tobacco Use    Smoking status: Never    Smokeless tobacco: Never   Vaping Use    Vaping Use: Never used   Substance and Sexual Activity    Alcohol use: Yes    Drug use: No    Sexual activity: Yes     Partners: Female     Birth control/protection: None Other Topics Concern    Not on file   Social History Narrative    ** Merged History Encounter **          Social Determinants of Health     Financial Resource Strain: Not on file   Food Insecurity: Not on file   Transportation Needs: Not on file   Physical Activity: Not on file   Stress: Not on file   Social Connections: Not on file   Intimate Partner Violence: Not on file   Housing Stability: Not on file         ALLERGIES: Latex and Latex, natural rubber    Review of Systems   Constitutional:  Negative for fever. HENT:  Negative for congestion and sinus pressure. Respiratory:  Negative for shortness of breath. Cardiovascular:  Positive for chest pain. Gastrointestinal:  Positive for nausea. Negative for vomiting. Genitourinary:  Negative for dysuria. Musculoskeletal:  Negative for myalgias. Neurological:  Negative for dizziness and headaches. Hematological:  Negative for adenopathy. Psychiatric/Behavioral:  The patient is not nervous/anxious. All other systems reviewed and are negative. Vitals:    03/13/23 1250   BP: 117/79   Pulse: 95   Resp: 16   Temp: 98.4 °F (36.9 °C)   SpO2: 100%   Weight: 90 kg (198 lb 6.6 oz)   Height: 5' 6\" (1.676 m)            Physical Exam  Vitals and nursing note reviewed. Constitutional:       General: She is not in acute distress. Appearance: Normal appearance. She is normal weight. HENT:      Head: Normocephalic and atraumatic. Eyes:      Extraocular Movements: Extraocular movements intact. Pupils: Pupils are equal, round, and reactive to light. Cardiovascular:      Rate and Rhythm: Normal rate and regular rhythm. Heart sounds: Normal heart sounds. Pulmonary:      Breath sounds: Normal breath sounds. Abdominal:      Palpations: Abdomen is soft. Tenderness: There is no abdominal tenderness. Lymphadenopathy:      Cervical: No cervical adenopathy. Skin:     General: Skin is warm and dry.    Neurological:      General: No focal deficit present. Mental Status: She is alert and oriented to person, place, and time. Psychiatric:         Mood and Affect: Mood normal.         Behavior: Behavior normal.         Thought Content: Thought content normal.        Medical Decision Making  28-year-old female with history of bipolar 1 disorder, IBS and tachycardia presents to ED with 2 days of chest pain. Vital signs stable in triage with heart rate at 95 bpm with oxygen saturation 100% and respiratory rate at 16. Physical exam unremarkable with heart and lung sounds normal.  Labs notable for troponin less than 4 but slightly elevated D-dimer so CTA of chest was ordered as outlined below. Also noted low hemoglobin at 9.2. Patient reports that she noticed that she has had a history of iron deficiency anemia due to heavy periods. She is not currently on iron supplements. See below for EKG interpretation. Chest x-ray showed no acute abnormalities and CTA of chest showed no evidence of PE. Patient received IV fluids and Zofran while in ED with improvement of symptoms and was able to pass p.o. challenge without difficulty. Patient will be discharged with instructions for conservative care, follow-up and return precautions. Amount and/or Complexity of Data Reviewed  Labs: ordered. Decision-making details documented in ED Course. Radiology: ordered. ECG/medicine tests: ordered. Risk  OTC drugs. Prescription drug management. ED Course as of 03/13/23 1434   Mon Mar 13, 2023   1304 ED EKG interpretation:  Rhythm: normal sinus rhythm; and regular . Rate (approx.): 87; Axis: normal; P wave: normal; QRS interval: normal ; ST/T wave: non-specific changes; in  Lead: III, V4 , V5 , and V6 ; Other findings: abnormal ekg, no acute ischemia, unchanged from prior.  This EKG was interpreted by Calixto Pulido MD,ED Provider.   [LA]   1336 Chest x-ray showed no acute abnormality [AH]   1349 Troponin-High Sensitivity: <4 [AH]   1349 D-dimer(!): 1.37  Ordered CTA chest [AH]   1430 CTA chest showed There is no pulmonary embolism.  There is no aortic aneurysm or dissection [AH]      ED Course User Index  [AH] RENZO Fernando  [LA] Eva Barth MD       Procedures

## 2023-03-13 NOTE — Clinical Note
[facility] EM[facility]  [address]  [address]  [phone]    Work/School Note    Date: 3/13/2023    To Whom It May concern:    Yumiko Cano was seen and treated today in the emergency room by the following provider(s):  Attending Provider: Bala Dunham MD  Physician Assistant: Song Orosco. Yumiko Cano is excused from work/school on 03/13/23 and 03/14/23. She is medically clear to return to work/school on 3/15/2023.        Sincerely,          RENZO Feliciano

## 2023-03-13 NOTE — ED TRIAGE NOTES
34year old female comes to ED via POV for CC of cheat pain of 7/10. Pt states pain started Friday afternoon, dulled over the weekend, and came back this morning. Pt is A&Ox4.

## 2024-01-16 ENCOUNTER — APPOINTMENT (OUTPATIENT)
Facility: HOSPITAL | Age: 34
End: 2024-01-16
Attending: STUDENT IN AN ORGANIZED HEALTH CARE EDUCATION/TRAINING PROGRAM
Payer: COMMERCIAL

## 2024-01-16 ENCOUNTER — HOSPITAL ENCOUNTER (EMERGENCY)
Facility: HOSPITAL | Age: 34
Discharge: HOME | End: 2024-01-16
Attending: EMERGENCY MEDICINE
Payer: COMMERCIAL

## 2024-01-16 VITALS
SYSTOLIC BLOOD PRESSURE: 113 MMHG | TEMPERATURE: 98 F | HEIGHT: 66 IN | RESPIRATION RATE: 20 BRPM | DIASTOLIC BLOOD PRESSURE: 85 MMHG | BODY MASS INDEX: 35.57 KG/M2 | WEIGHT: 221.34 LBS | HEART RATE: 84 BPM | OXYGEN SATURATION: 100 %

## 2024-01-16 DIAGNOSIS — R07.9 CHEST PAIN, UNSPECIFIED TYPE: Primary | ICD-10-CM

## 2024-01-16 LAB
ALBUMIN SERPL-MCNC: 3.5 G/DL (ref 3.5–5)
ALBUMIN/GLOB SERPL: 0.9 (ref 1.1–2.2)
ALP SERPL-CCNC: 86 U/L (ref 45–117)
ALT SERPL-CCNC: 25 U/L (ref 12–78)
ANION GAP SERPL CALC-SCNC: 8 MMOL/L (ref 5–15)
AST SERPL-CCNC: 15 U/L (ref 15–37)
BASOPHILS # BLD: 0 K/UL (ref 0–0.1)
BASOPHILS NFR BLD: 1 % (ref 0–1)
BILIRUB SERPL-MCNC: 0.2 MG/DL (ref 0.2–1)
BUN SERPL-MCNC: 8 MG/DL (ref 6–20)
BUN/CREAT SERPL: 7 (ref 12–20)
CALCIUM SERPL-MCNC: 8.2 MG/DL (ref 8.5–10.1)
CHLORIDE SERPL-SCNC: 104 MMOL/L (ref 97–108)
CO2 SERPL-SCNC: 27 MMOL/L (ref 21–32)
CREAT SERPL-MCNC: 1.08 MG/DL (ref 0.55–1.02)
DIFFERENTIAL METHOD BLD: ABNORMAL
EKG ATRIAL RATE: 84 BPM
EKG DIAGNOSIS: NORMAL
EKG P AXIS: 52 DEGREES
EKG P-R INTERVAL: 188 MS
EKG Q-T INTERVAL: 384 MS
EKG QRS DURATION: 80 MS
EKG QTC CALCULATION (BAZETT): 453 MS
EKG R AXIS: 26 DEGREES
EKG T AXIS: -2 DEGREES
EKG VENTRICULAR RATE: 84 BPM
EOSINOPHIL # BLD: 0.2 K/UL (ref 0–0.4)
EOSINOPHIL NFR BLD: 4 % (ref 0–7)
ERYTHROCYTE [DISTWIDTH] IN BLOOD BY AUTOMATED COUNT: 15.1 % (ref 11.5–14.5)
GLOBULIN SER CALC-MCNC: 4 G/DL (ref 2–4)
GLUCOSE SERPL-MCNC: 98 MG/DL (ref 65–100)
HCT VFR BLD AUTO: 33.2 % (ref 35–47)
HGB BLD-MCNC: 10.9 G/DL (ref 11.5–16)
IMM GRANULOCYTES # BLD AUTO: 0 K/UL (ref 0–0.04)
IMM GRANULOCYTES NFR BLD AUTO: 0 % (ref 0–0.5)
LYMPHOCYTES # BLD: 1.5 K/UL (ref 0.8–3.5)
LYMPHOCYTES NFR BLD: 28 % (ref 12–49)
MCH RBC QN AUTO: 27.1 PG (ref 26–34)
MCHC RBC AUTO-ENTMCNC: 32.8 G/DL (ref 30–36.5)
MCV RBC AUTO: 82.6 FL (ref 80–99)
MONOCYTES # BLD: 0.4 K/UL (ref 0–1)
MONOCYTES NFR BLD: 8 % (ref 5–13)
NEUTS SEG # BLD: 3.1 K/UL (ref 1.8–8)
NEUTS SEG NFR BLD: 59 % (ref 32–75)
NRBC # BLD: 0 K/UL (ref 0–0.01)
NRBC BLD-RTO: 0 PER 100 WBC
PLATELET # BLD AUTO: 357 K/UL (ref 150–400)
PMV BLD AUTO: 9.8 FL (ref 8.9–12.9)
POTASSIUM SERPL-SCNC: 3.8 MMOL/L (ref 3.5–5.1)
PROT SERPL-MCNC: 7.5 G/DL (ref 6.4–8.2)
RBC # BLD AUTO: 4.02 M/UL (ref 3.8–5.2)
SODIUM SERPL-SCNC: 139 MMOL/L (ref 136–145)
TROPONIN I SERPL HS-MCNC: 6 NG/L (ref 0–51)
WBC # BLD AUTO: 5.3 K/UL (ref 3.6–11)

## 2024-01-16 PROCEDURE — 80053 COMPREHEN METABOLIC PANEL: CPT

## 2024-01-16 PROCEDURE — 84484 ASSAY OF TROPONIN QUANT: CPT

## 2024-01-16 PROCEDURE — 99285 EMERGENCY DEPT VISIT HI MDM: CPT

## 2024-01-16 PROCEDURE — 93005 ELECTROCARDIOGRAM TRACING: CPT | Performed by: EMERGENCY MEDICINE

## 2024-01-16 PROCEDURE — 71046 X-RAY EXAM CHEST 2 VIEWS: CPT

## 2024-01-16 PROCEDURE — 36415 COLL VENOUS BLD VENIPUNCTURE: CPT

## 2024-01-16 PROCEDURE — 85025 COMPLETE CBC W/AUTO DIFF WBC: CPT

## 2024-01-16 PROCEDURE — 6370000000 HC RX 637 (ALT 250 FOR IP): Performed by: EMERGENCY MEDICINE

## 2024-01-16 RX ORDER — BENZONATATE 100 MG/1
100 CAPSULE ORAL 3 TIMES DAILY PRN
Qty: 30 CAPSULE | Refills: 0 | Status: SHIPPED | OUTPATIENT
Start: 2024-01-16 | End: 2024-01-26

## 2024-01-16 RX ORDER — IBUPROFEN 600 MG/1
600 TABLET ORAL
Status: COMPLETED | OUTPATIENT
Start: 2024-01-16 | End: 2024-01-16

## 2024-01-16 RX ADMIN — IBUPROFEN 600 MG: 600 TABLET, FILM COATED ORAL at 15:44

## 2024-01-16 ASSESSMENT — PAIN SCALES - GENERAL: PAINLEVEL_OUTOF10: 5

## 2024-01-16 ASSESSMENT — PAIN DESCRIPTION - ORIENTATION: ORIENTATION: LEFT

## 2024-01-16 ASSESSMENT — LIFESTYLE VARIABLES
HOW OFTEN DO YOU HAVE A DRINK CONTAINING ALCOHOL: NEVER
HOW MANY STANDARD DRINKS CONTAINING ALCOHOL DO YOU HAVE ON A TYPICAL DAY: PATIENT DOES NOT DRINK

## 2024-01-16 ASSESSMENT — PAIN - FUNCTIONAL ASSESSMENT: PAIN_FUNCTIONAL_ASSESSMENT: 0-10

## 2024-01-16 ASSESSMENT — ENCOUNTER SYMPTOMS
SHORTNESS OF BREATH: 1
GASTROINTESTINAL NEGATIVE: 1
EYES NEGATIVE: 1

## 2024-01-16 ASSESSMENT — PAIN DESCRIPTION - LOCATION: LOCATION: CHEST

## 2024-01-16 NOTE — ED TRIAGE NOTES
Pt reports productive cough with yellow/green sputum since last Thursday. Pt reports a pulsating pain in her L chest since last night. Pt reports this am she went to work and the pain worsened. Pt reports she has also been short of breath for the past 2 hours.

## 2024-01-16 NOTE — ED PROVIDER NOTES
[facility] EMERGENCY [facility]  EMERGENCY DEPARTMENT ENCOUNTER      Pt Name: Kisha Matute  MRN: [id number]  Birthdate 1990  Date of evaluation: 1/16/2024  Provider: Ilia Armijo MD    CHIEF COMPLAINT       Chief Complaint   Patient presents with    Chest Pain    Cough    Shortness of Breath         HISTORY OF PRESENT ILLNESS   (Location/Symptom, Timing/Onset, Context/Setting, Quality, Duration, Modifying Factors, Severity)  Note limiting factors.   33-year-old female with PMHx of bipolar disorder and IBS presents to the emergency department complaining of chest pain since yesterday.  Pt also reports productive cough with yellow/green sputum since x 6 days. Pt reports a pulsating pain in her L chest since last night. Pt reports this am she went to work and the pain worsened. Pt reports she has also been short of breath for the past 2 hours.  She has no additional complaints at this time    The history is provided by the patient.         Review of External Medical Records:     Nursing Notes were reviewed.    REVIEW OF SYSTEMS    (2-9 systems for level 4, 10 or more for level 5)     Review of Systems   Constitutional: Negative.    HENT: Negative.     Eyes: Negative.    Respiratory:  Positive for shortness of breath.    Cardiovascular:  Positive for chest pain.   Gastrointestinal: Negative.    Genitourinary: Negative.    Musculoskeletal: Negative.    Skin: Negative.    Neurological: Negative.    Psychiatric/Behavioral: Negative.         Except as noted above the remainder of the review of systems was reviewed and negative.       PAST MEDICAL HISTORY     Past Medical History:   Diagnosis Date    Bipolar 1 disorder (HCC)     Headache     IBS (irritable bowel syndrome)     Tachycardia          SURGICAL HISTORY     No past surgical history on file.      CURRENT MEDICATIONS       Previous Medications    ARIPIPRAZOLE (ABILIFY) 10 MG TABLET    Take 10 mg by mouth daily    FERROUS SULFATE (IRON 325) 325 (65 FE) MG

## 2024-01-16 NOTE — ED NOTES
Pain assessment on discharge was   Condition Stable  Patient discharged to home  Patient education was completed  Education taught to patient  Teaching method used was handout and verbal  Understanding of teaching was good  Patient was discharged ambulatory  Discharged with self  Valuables were given to patient remained in possession of belongings during stay.

## 2024-01-16 NOTE — DISCHARGE INSTRUCTIONS
You were seen in the emergency department for chest pain.  The results of your tests were reassuring.  Although an exact cause of your symptoms was not identified, the most likely cause is chest wall strain from coughing.  Please take any medications prescribed at this visit as instructed.  Please follow-up with your PCP or return to the emergency department if you experience a worsening of symptoms or any new symptoms that are concerning to you.

## 2025-02-21 ENCOUNTER — HOSPITAL ENCOUNTER (EMERGENCY)
Facility: HOSPITAL | Age: 35
Discharge: HOME | End: 2025-02-21
Attending: STUDENT IN AN ORGANIZED HEALTH CARE EDUCATION/TRAINING PROGRAM
Payer: COMMERCIAL

## 2025-02-21 ENCOUNTER — APPOINTMENT (OUTPATIENT)
Facility: HOSPITAL | Age: 35
End: 2025-02-21
Payer: COMMERCIAL

## 2025-02-21 VITALS
HEART RATE: 88 BPM | TEMPERATURE: 97.9 F | DIASTOLIC BLOOD PRESSURE: 74 MMHG | SYSTOLIC BLOOD PRESSURE: 121 MMHG | HEIGHT: 66 IN | BODY MASS INDEX: 34.87 KG/M2 | OXYGEN SATURATION: 100 % | RESPIRATION RATE: 19 BRPM | WEIGHT: 217 LBS

## 2025-02-21 DIAGNOSIS — K29.00 ACUTE GASTRITIS WITHOUT HEMORRHAGE, UNSPECIFIED GASTRITIS TYPE: Primary | ICD-10-CM

## 2025-02-21 DIAGNOSIS — D18.03 HEMANGIOMA OF LIVER: ICD-10-CM

## 2025-02-21 DIAGNOSIS — R10.9 LEFT SIDED ABDOMINAL PAIN: ICD-10-CM

## 2025-02-21 LAB
ALBUMIN SERPL-MCNC: 3.9 G/DL (ref 3.5–5)
ALBUMIN/GLOB SERPL: 1 (ref 1.1–2.2)
ALP SERPL-CCNC: 106 U/L (ref 45–117)
ALT SERPL-CCNC: 27 U/L (ref 12–78)
ANION GAP SERPL CALC-SCNC: 6 MMOL/L (ref 2–12)
AST SERPL-CCNC: 13 U/L (ref 15–37)
BASOPHILS # BLD: 0.01 K/UL (ref 0–0.1)
BASOPHILS NFR BLD: 0.1 % (ref 0–1)
BILIRUB SERPL-MCNC: 0.6 MG/DL (ref 0.2–1)
BUN SERPL-MCNC: 10 MG/DL (ref 6–20)
BUN/CREAT SERPL: 10 (ref 12–20)
CALCIUM SERPL-MCNC: 8.5 MG/DL (ref 8.5–10.1)
CHLORIDE SERPL-SCNC: 104 MMOL/L (ref 97–108)
CO2 SERPL-SCNC: 29 MMOL/L (ref 21–32)
CREAT SERPL-MCNC: 1.04 MG/DL (ref 0.55–1.02)
DIFFERENTIAL METHOD BLD: ABNORMAL
EOSINOPHIL # BLD: 0.06 K/UL (ref 0–0.4)
EOSINOPHIL NFR BLD: 0.9 % (ref 0–7)
ERYTHROCYTE [DISTWIDTH] IN BLOOD BY AUTOMATED COUNT: 12.9 % (ref 11.5–14.5)
FLUAV RNA SPEC QL NAA+PROBE: NOT DETECTED
FLUBV RNA SPEC QL NAA+PROBE: NOT DETECTED
GLOBULIN SER CALC-MCNC: 3.9 G/DL (ref 2–4)
GLUCOSE SERPL-MCNC: 106 MG/DL (ref 65–100)
HCG SERPL QL: NEGATIVE
HCT VFR BLD AUTO: 40.1 % (ref 35–47)
HGB BLD-MCNC: 12.9 G/DL (ref 11.5–16)
IMM GRANULOCYTES # BLD AUTO: 0.01 K/UL (ref 0–0.04)
IMM GRANULOCYTES NFR BLD AUTO: 0.1 % (ref 0–0.5)
LIPASE SERPL-CCNC: 20 U/L (ref 13–75)
LYMPHOCYTES # BLD: 0.46 K/UL (ref 0.8–3.5)
LYMPHOCYTES NFR BLD: 6.7 % (ref 12–49)
MCH RBC QN AUTO: 29.4 PG (ref 26–34)
MCHC RBC AUTO-ENTMCNC: 32.2 G/DL (ref 30–36.5)
MCV RBC AUTO: 91.3 FL (ref 80–99)
MONOCYTES # BLD: 0.48 K/UL (ref 0–1)
MONOCYTES NFR BLD: 7 % (ref 5–13)
NEUTS SEG # BLD: 5.88 K/UL (ref 1.8–8)
NEUTS SEG NFR BLD: 85.2 % (ref 32–75)
NRBC # BLD: 0 K/UL (ref 0–0.01)
NRBC BLD-RTO: 0 PER 100 WBC
PLATELET # BLD AUTO: 268 K/UL (ref 150–400)
PMV BLD AUTO: 9.5 FL (ref 8.9–12.9)
POTASSIUM SERPL-SCNC: 4.2 MMOL/L (ref 3.5–5.1)
PROT SERPL-MCNC: 7.8 G/DL (ref 6.4–8.2)
RBC # BLD AUTO: 4.39 M/UL (ref 3.8–5.2)
RBC MORPH BLD: ABNORMAL
SARS-COV-2 RNA RESP QL NAA+PROBE: NOT DETECTED
SODIUM SERPL-SCNC: 139 MMOL/L (ref 136–145)
SOURCE: NORMAL
WBC # BLD AUTO: 6.9 K/UL (ref 3.6–11)

## 2025-02-21 PROCEDURE — 99285 EMERGENCY DEPT VISIT HI MDM: CPT

## 2025-02-21 PROCEDURE — 36415 COLL VENOUS BLD VENIPUNCTURE: CPT

## 2025-02-21 PROCEDURE — 96375 TX/PRO/DX INJ NEW DRUG ADDON: CPT

## 2025-02-21 PROCEDURE — 2580000003 HC RX 258: Performed by: STUDENT IN AN ORGANIZED HEALTH CARE EDUCATION/TRAINING PROGRAM

## 2025-02-21 PROCEDURE — 74177 CT ABD & PELVIS W/CONTRAST: CPT

## 2025-02-21 PROCEDURE — 96361 HYDRATE IV INFUSION ADD-ON: CPT

## 2025-02-21 PROCEDURE — 80053 COMPREHEN METABOLIC PANEL: CPT

## 2025-02-21 PROCEDURE — 6360000004 HC RX CONTRAST MEDICATION: Performed by: STUDENT IN AN ORGANIZED HEALTH CARE EDUCATION/TRAINING PROGRAM

## 2025-02-21 PROCEDURE — 83690 ASSAY OF LIPASE: CPT

## 2025-02-21 PROCEDURE — 84703 CHORIONIC GONADOTROPIN ASSAY: CPT

## 2025-02-21 PROCEDURE — 87636 SARSCOV2 & INF A&B AMP PRB: CPT

## 2025-02-21 PROCEDURE — 96374 THER/PROPH/DIAG INJ IV PUSH: CPT

## 2025-02-21 PROCEDURE — 85025 COMPLETE CBC W/AUTO DIFF WBC: CPT

## 2025-02-21 PROCEDURE — 6360000002 HC RX W HCPCS: Performed by: STUDENT IN AN ORGANIZED HEALTH CARE EDUCATION/TRAINING PROGRAM

## 2025-02-21 RX ORDER — 0.9 % SODIUM CHLORIDE 0.9 %
1000 INTRAVENOUS SOLUTION INTRAVENOUS ONCE
Status: COMPLETED | OUTPATIENT
Start: 2025-02-21 | End: 2025-02-21

## 2025-02-21 RX ORDER — IOPAMIDOL 755 MG/ML
100 INJECTION, SOLUTION INTRAVASCULAR
Status: COMPLETED | OUTPATIENT
Start: 2025-02-21 | End: 2025-02-21

## 2025-02-21 RX ORDER — PANTOPRAZOLE SODIUM 40 MG/1
40 TABLET, DELAYED RELEASE ORAL
Qty: 15 TABLET | Refills: 0 | Status: SHIPPED | OUTPATIENT
Start: 2025-02-21 | End: 2025-03-08

## 2025-02-21 RX ORDER — KETOROLAC TROMETHAMINE 30 MG/ML
15 INJECTION, SOLUTION INTRAMUSCULAR; INTRAVENOUS ONCE
Status: COMPLETED | OUTPATIENT
Start: 2025-02-21 | End: 2025-02-21

## 2025-02-21 RX ORDER — DICYCLOMINE HCL 20 MG
20 TABLET ORAL EVERY 6 HOURS PRN
Qty: 50 TABLET | Refills: 0 | Status: SHIPPED | OUTPATIENT
Start: 2025-02-21 | End: 2025-03-07

## 2025-02-21 RX ORDER — ONDANSETRON 2 MG/ML
4 INJECTION INTRAMUSCULAR; INTRAVENOUS ONCE
Status: COMPLETED | OUTPATIENT
Start: 2025-02-21 | End: 2025-02-21

## 2025-02-21 RX ADMIN — KETOROLAC TROMETHAMINE 15 MG: 30 INJECTION, SOLUTION INTRAMUSCULAR at 09:21

## 2025-02-21 RX ADMIN — SODIUM CHLORIDE 1000 ML: 0.9 INJECTION, SOLUTION INTRAVENOUS at 09:00

## 2025-02-21 RX ADMIN — IOPAMIDOL 100 ML: 755 INJECTION, SOLUTION INTRAVENOUS at 10:25

## 2025-02-21 RX ADMIN — ONDANSETRON 4 MG: 2 INJECTION, SOLUTION INTRAMUSCULAR; INTRAVENOUS at 09:01

## 2025-02-21 ASSESSMENT — PAIN SCALES - GENERAL: PAINLEVEL_OUTOF10: 7

## 2025-02-21 ASSESSMENT — PAIN DESCRIPTION - FREQUENCY: FREQUENCY: CONTINUOUS

## 2025-02-21 ASSESSMENT — PAIN DESCRIPTION - LOCATION: LOCATION: ABDOMEN

## 2025-02-21 ASSESSMENT — PAIN DESCRIPTION - DESCRIPTORS: DESCRIPTORS: CRAMPING;ACHING

## 2025-02-21 ASSESSMENT — PAIN - FUNCTIONAL ASSESSMENT: PAIN_FUNCTIONAL_ASSESSMENT: 0-10

## 2025-02-21 ASSESSMENT — PAIN DESCRIPTION - PAIN TYPE: TYPE: ACUTE PAIN

## 2025-02-21 NOTE — ED NOTES
Discharge instructions were given to the patient by Jeremy Bruce RN  .  The patient left the Emergency Department alert and oriented and in no acute distress with 2 prescription(s). The patient was encouraged to call or return to the ED for worsening issues or problems and was encouraged to schedule a follow up appointment for continuing care.  The patient verbalized understanding of discharge instructions and prescriptions; all questions were answered. The patient has no further concerns at this time.

## 2025-02-21 NOTE — ED TRIAGE NOTES
Pt came in the ED with complaints of abdominal pain, vomiting and body aches started yesterday. Pt reports she vomited 4 times today and unable to hold off fluids and food. Pt denies cough and colds. Pt denies diarrhea.

## 2025-02-21 NOTE — ED NOTES
Pt presents to ED complaining of LUQ and LLQ abdominal pain with N/V, but no diarrhea, and body aches and chills since last night. Pt states she ate [employer] last night, and that approximately 30 minutes later, she began to vomit. Pt endorses tenderness to palpation, and denies urinary symptoms. Pt is alert and oriented x 4, RR even and unlabored, skin is warm and dry. Pt appears in NAD at this time. Assessment completed and pt updated on plan of care.  Call bell in reach.   Emergency Department Nursing Plan of Care  The Nursing Plan of Care is developed from the Nursing assessment and Emergency Department Attending provider initial evaluation.  The plan of care may be reviewed in the “ED Provider note”.  The Plan of Care was developed with the following considerations:  Patient / Family readiness to learn indicated by:Refer to Medical chart in [facility]  Persons(s) to be included in education: Refer to Medical chart in [facility]  Barriers to Learning/Limitations:Normal

## 2025-02-21 NOTE — DISCHARGE INSTRUCTIONS
Please make a followup with your primary care physician and a gastroenterologist.  If you have any new or worsening concerning medical symptoms please return to the emergency department.

## 2025-02-21 NOTE — ED PROVIDER NOTES
[facility] EMERGENCY DEPARTMENT  EMERGENCY DEPARTMENT ENCOUNTER       Pt Name: Kisha Matute  MRN: [id number]  Birthdate 1990  Date of evaluation: 2/21/2025  Provider: Trace Roper MD   PCP: Terra Morales MD  Note Started: 11:18 AM EST 2/21/25     CHIEF COMPLAINT       Chief Complaint   Patient presents with    Abdominal Pain    Vomiting     HISTORY OF PRESENT ILLNESS: 1 or more elements      History From: patient, History limited by: none     Kisha Matute is a 34 y.o. female w hx of IBS who presents to the ED with abdominal pain. She states that this in the center of her abdomen and on the left side.  She states that she has had vomiting and nausea. She denies any diarrhea which she often has with her IBS.  No recent cough, congestion, fever, or other sick contact.  She states that symptoms began after eating fast food.    Please See MDM for Additional Details of the HPI/PMH  Nursing Notes were all reviewed and agreed with or any disagreements were addressed in the HPI.     REVIEW OF SYSTEMS      Positives and Pertinent negatives as per HPI.    PAST HISTORY     Past Medical History:  Past Medical History:   Diagnosis Date    Bipolar 1 disorder (HCC)     Headache     IBS (irritable bowel syndrome)     Tachycardia        Past Surgical History:  History reviewed. No pertinent surgical history.    Family History:  Family History   Problem Relation Age of Onset    Heart Disease Father     Diabetes Father        Social History:  Social History     Tobacco Use    Smoking status: Never    Smokeless tobacco: Never   Substance Use Topics    Alcohol use: Yes    Drug use: No       Allergies:  Allergies   Allergen Reactions    Latex Itching    Apple Itching    Cherry Itching    Egg-Derived Products Hives       CURRENT MEDICATIONS      Previous Medications    ARIPIPRAZOLE (ABILIFY) 10 MG TABLET    Take 1 tablet by mouth daily    FERROUS SULFATE (IRON 325) 325 (65 FE) MG TABLET